# Patient Record
Sex: FEMALE | Race: BLACK OR AFRICAN AMERICAN | NOT HISPANIC OR LATINO | Employment: FULL TIME | URBAN - METROPOLITAN AREA
[De-identification: names, ages, dates, MRNs, and addresses within clinical notes are randomized per-mention and may not be internally consistent; named-entity substitution may affect disease eponyms.]

---

## 2017-09-19 ENCOUNTER — TRANSCRIBE ORDERS (OUTPATIENT)
Dept: LAB | Facility: HOSPITAL | Age: 27
End: 2017-09-19

## 2017-09-19 ENCOUNTER — LAB (OUTPATIENT)
Dept: LAB | Facility: HOSPITAL | Age: 27
End: 2017-09-19

## 2017-09-19 DIAGNOSIS — N93.9 ABNORMAL UTERINE BLEEDING (AUB): Primary | ICD-10-CM

## 2017-09-19 DIAGNOSIS — N93.9 ABNORMAL UTERINE BLEEDING (AUB): ICD-10-CM

## 2017-09-19 LAB
DEPRECATED RDW RBC AUTO: 43.9 FL (ref 37–54)
ERYTHROCYTE [DISTWIDTH] IN BLOOD BY AUTOMATED COUNT: 15.8 % (ref 11.3–14.5)
ESTRADIOL SERPL HS-MCNC: 89 PG/ML
FSH SERPL-ACNC: 5.7 MIU/ML
GLUCOSE BLD-MCNC: 79 MG/DL (ref 70–100)
HCG INTACT+B SERPL-ACNC: <5 MIU/ML
HCT VFR BLD AUTO: 33.7 % (ref 34.5–44)
HGB BLD-MCNC: 10.4 G/DL (ref 11.5–15.5)
LH SERPL-ACNC: 12.9 MIU/ML
MCH RBC QN AUTO: 23.4 PG (ref 27–31)
MCHC RBC AUTO-ENTMCNC: 30.9 G/DL (ref 32–36)
MCV RBC AUTO: 75.9 FL (ref 80–99)
PLATELET # BLD AUTO: 307 10*3/MM3 (ref 150–450)
PMV BLD AUTO: 11.1 FL (ref 6–12)
PROGEST SERPL-MCNC: 0.18 NG/ML
PROLACTIN SERPL-MCNC: 8.8 NG/ML
RBC # BLD AUTO: 4.44 10*6/MM3 (ref 3.89–5.14)
TESTOST SERPL-MCNC: 31.19 NG/DL
TSH SERPL DL<=0.05 MIU/L-ACNC: 0.64 MIU/ML (ref 0.35–5.35)
WBC NRBC COR # BLD: 6.44 10*3/MM3 (ref 3.5–10.8)

## 2017-09-19 PROCEDURE — 84144 ASSAY OF PROGESTERONE: CPT | Performed by: PHYSICIAN ASSISTANT

## 2017-09-19 PROCEDURE — 84702 CHORIONIC GONADOTROPIN TEST: CPT | Performed by: PHYSICIAN ASSISTANT

## 2017-09-19 PROCEDURE — 84443 ASSAY THYROID STIM HORMONE: CPT | Performed by: PHYSICIAN ASSISTANT

## 2017-09-19 PROCEDURE — 83001 ASSAY OF GONADOTROPIN (FSH): CPT | Performed by: PHYSICIAN ASSISTANT

## 2017-09-19 PROCEDURE — 82627 DEHYDROEPIANDROSTERONE: CPT | Performed by: PHYSICIAN ASSISTANT

## 2017-09-19 PROCEDURE — 84146 ASSAY OF PROLACTIN: CPT | Performed by: PHYSICIAN ASSISTANT

## 2017-09-19 PROCEDURE — 82947 ASSAY GLUCOSE BLOOD QUANT: CPT | Performed by: PHYSICIAN ASSISTANT

## 2017-09-19 PROCEDURE — 84403 ASSAY OF TOTAL TESTOSTERONE: CPT | Performed by: PHYSICIAN ASSISTANT

## 2017-09-19 PROCEDURE — 82670 ASSAY OF TOTAL ESTRADIOL: CPT | Performed by: PHYSICIAN ASSISTANT

## 2017-09-19 PROCEDURE — 83525 ASSAY OF INSULIN: CPT | Performed by: PHYSICIAN ASSISTANT

## 2017-09-19 PROCEDURE — 83002 ASSAY OF GONADOTROPIN (LH): CPT | Performed by: PHYSICIAN ASSISTANT

## 2017-09-19 PROCEDURE — 36415 COLL VENOUS BLD VENIPUNCTURE: CPT | Performed by: PHYSICIAN ASSISTANT

## 2017-09-19 PROCEDURE — 85027 COMPLETE CBC AUTOMATED: CPT | Performed by: PHYSICIAN ASSISTANT

## 2017-09-19 PROCEDURE — 82157 ASSAY OF ANDROSTENEDIONE: CPT | Performed by: PHYSICIAN ASSISTANT

## 2017-09-19 PROCEDURE — 84402 ASSAY OF FREE TESTOSTERONE: CPT | Performed by: PHYSICIAN ASSISTANT

## 2017-09-20 LAB — DHEA-S SERPL-MCNC: 95.9 UG/DL (ref 84.8–378)

## 2017-09-21 LAB
INSULIN SERPL-ACNC: 16 UIU/ML
TESTOST FREE SERPL-MCNC: 0.8 PG/ML (ref 0–4.2)

## 2017-09-24 LAB — ANDROST SERPL-MCNC: 199 NG/DL (ref 41–262)

## 2020-07-01 ENCOUNTER — OFFICE VISIT (OUTPATIENT)
Dept: FAMILY MEDICINE CLINIC | Facility: CLINIC | Age: 30
End: 2020-07-01

## 2020-07-01 ENCOUNTER — LAB (OUTPATIENT)
Dept: LAB | Facility: HOSPITAL | Age: 30
End: 2020-07-01

## 2020-07-01 VITALS
BODY MASS INDEX: 37.48 KG/M2 | DIASTOLIC BLOOD PRESSURE: 78 MMHG | HEART RATE: 97 BPM | SYSTOLIC BLOOD PRESSURE: 118 MMHG | HEIGHT: 66 IN | OXYGEN SATURATION: 98 % | WEIGHT: 233.2 LBS

## 2020-07-01 DIAGNOSIS — E55.9 VITAMIN D DEFICIENCY: ICD-10-CM

## 2020-07-01 DIAGNOSIS — Z11.59 ENCOUNTER FOR HEPATITIS C SCREENING TEST FOR LOW RISK PATIENT: ICD-10-CM

## 2020-07-01 DIAGNOSIS — Z23 NEED FOR TDAP VACCINATION: ICD-10-CM

## 2020-07-01 DIAGNOSIS — J45.20 MILD INTERMITTENT ASTHMA WITHOUT COMPLICATION: ICD-10-CM

## 2020-07-01 DIAGNOSIS — Z76.89 ENCOUNTER TO ESTABLISH CARE: ICD-10-CM

## 2020-07-01 DIAGNOSIS — Z00.00 PHYSICAL EXAM, ANNUAL: Primary | ICD-10-CM

## 2020-07-01 DIAGNOSIS — D68.00 VON WILLEBRAND DISEASE (HCC): ICD-10-CM

## 2020-07-01 LAB
25(OH)D3 SERPL-MCNC: 29.1 NG/ML (ref 30–100)
ALBUMIN SERPL-MCNC: 4 G/DL (ref 3.5–5.2)
ALBUMIN/GLOB SERPL: 1.1 G/DL
ALP SERPL-CCNC: 73 U/L (ref 39–117)
ALT SERPL W P-5'-P-CCNC: 29 U/L (ref 1–33)
ANION GAP SERPL CALCULATED.3IONS-SCNC: 11.2 MMOL/L (ref 5–15)
AST SERPL-CCNC: 28 U/L (ref 1–32)
BASOPHILS # BLD AUTO: 0.02 10*3/MM3 (ref 0–0.2)
BASOPHILS NFR BLD AUTO: 0.3 % (ref 0–1.5)
BILIRUB SERPL-MCNC: 0.3 MG/DL (ref 0.2–1.2)
BUN SERPL-MCNC: 8 MG/DL (ref 6–20)
BUN/CREAT SERPL: 7.3 (ref 7–25)
CALCIUM SPEC-SCNC: 9.4 MG/DL (ref 8.6–10.5)
CHLORIDE SERPL-SCNC: 104 MMOL/L (ref 98–107)
CHOLEST SERPL-MCNC: 148 MG/DL (ref 0–200)
CO2 SERPL-SCNC: 23.8 MMOL/L (ref 22–29)
CREAT SERPL-MCNC: 1.09 MG/DL (ref 0.57–1)
DEPRECATED RDW RBC AUTO: 39.1 FL (ref 37–54)
EOSINOPHIL # BLD AUTO: 0.24 10*3/MM3 (ref 0–0.4)
EOSINOPHIL NFR BLD AUTO: 3.5 % (ref 0.3–6.2)
ERYTHROCYTE [DISTWIDTH] IN BLOOD BY AUTOMATED COUNT: 15 % (ref 12.3–15.4)
GFR SERPL CREATININE-BSD FRML MDRD: 72 ML/MIN/1.73
GLOBULIN UR ELPH-MCNC: 3.6 GM/DL
GLUCOSE SERPL-MCNC: 92 MG/DL (ref 65–99)
HCT VFR BLD AUTO: 39.6 % (ref 34–46.6)
HDLC SERPL-MCNC: 51 MG/DL (ref 40–60)
HGB BLD-MCNC: 12.9 G/DL (ref 12–15.9)
IMM GRANULOCYTES # BLD AUTO: 0.02 10*3/MM3 (ref 0–0.05)
IMM GRANULOCYTES NFR BLD AUTO: 0.3 % (ref 0–0.5)
LDLC SERPL CALC-MCNC: 76 MG/DL (ref 0–100)
LDLC/HDLC SERPL: 1.49 {RATIO}
LYMPHOCYTES # BLD AUTO: 2.52 10*3/MM3 (ref 0.7–3.1)
LYMPHOCYTES NFR BLD AUTO: 37.1 % (ref 19.6–45.3)
MCH RBC QN AUTO: 23.8 PG (ref 26.6–33)
MCHC RBC AUTO-ENTMCNC: 32.6 G/DL (ref 31.5–35.7)
MCV RBC AUTO: 72.9 FL (ref 79–97)
MICROCYTES BLD QL: NORMAL
MONOCYTES # BLD AUTO: 0.54 10*3/MM3 (ref 0.1–0.9)
MONOCYTES NFR BLD AUTO: 8 % (ref 5–12)
NEUTROPHILS NFR BLD AUTO: 3.45 10*3/MM3 (ref 1.7–7)
NEUTROPHILS NFR BLD AUTO: 50.8 % (ref 42.7–76)
NRBC BLD AUTO-RTO: 0 /100 WBC (ref 0–0.2)
PLAT MORPH BLD: NORMAL
PLATELET # BLD AUTO: 293 10*3/MM3 (ref 140–450)
PMV BLD AUTO: 11.6 FL (ref 6–12)
POTASSIUM SERPL-SCNC: 4.6 MMOL/L (ref 3.5–5.2)
PROT SERPL-MCNC: 7.6 G/DL (ref 6–8.5)
RBC # BLD AUTO: 5.43 10*6/MM3 (ref 3.77–5.28)
SODIUM SERPL-SCNC: 139 MMOL/L (ref 136–145)
TRIGL SERPL-MCNC: 105 MG/DL (ref 0–150)
TSH SERPL DL<=0.05 MIU/L-ACNC: 1.36 UIU/ML (ref 0.27–4.2)
VLDLC SERPL-MCNC: 21 MG/DL (ref 5–40)
WBC # BLD AUTO: 6.79 10*3/MM3 (ref 3.4–10.8)
WBC MORPH BLD: NORMAL

## 2020-07-01 PROCEDURE — 36415 COLL VENOUS BLD VENIPUNCTURE: CPT | Performed by: PHYSICIAN ASSISTANT

## 2020-07-01 PROCEDURE — 82306 VITAMIN D 25 HYDROXY: CPT | Performed by: PHYSICIAN ASSISTANT

## 2020-07-01 PROCEDURE — 80053 COMPREHEN METABOLIC PANEL: CPT | Performed by: PHYSICIAN ASSISTANT

## 2020-07-01 PROCEDURE — 86803 HEPATITIS C AB TEST: CPT | Performed by: PHYSICIAN ASSISTANT

## 2020-07-01 PROCEDURE — 85025 COMPLETE CBC W/AUTO DIFF WBC: CPT | Performed by: PHYSICIAN ASSISTANT

## 2020-07-01 PROCEDURE — 80061 LIPID PANEL: CPT | Performed by: PHYSICIAN ASSISTANT

## 2020-07-01 PROCEDURE — 99385 PREV VISIT NEW AGE 18-39: CPT | Performed by: PHYSICIAN ASSISTANT

## 2020-07-01 PROCEDURE — 84443 ASSAY THYROID STIM HORMONE: CPT | Performed by: PHYSICIAN ASSISTANT

## 2020-07-01 PROCEDURE — 90471 IMMUNIZATION ADMIN: CPT | Performed by: PHYSICIAN ASSISTANT

## 2020-07-01 PROCEDURE — 85007 BL SMEAR W/DIFF WBC COUNT: CPT | Performed by: PHYSICIAN ASSISTANT

## 2020-07-01 PROCEDURE — 90715 TDAP VACCINE 7 YRS/> IM: CPT | Performed by: PHYSICIAN ASSISTANT

## 2020-07-01 RX ORDER — ALBUTEROL SULFATE 90 UG/1
2 AEROSOL, METERED RESPIRATORY (INHALATION) EVERY 4 HOURS PRN
Qty: 1 INHALER | Refills: 5 | OUTPATIENT
Start: 2020-07-01 | End: 2021-06-23

## 2020-07-01 NOTE — PATIENT INSTRUCTIONS
"General Health Maintenance:  -Yearly dermatology exam  -Yearly eye exam if you are diabetic, otherwise every 2 years for patients without diabetes  -Dental exams/cleanings at least twice a year  -Staying current on your required colonoscopy, starts at age 50 unless there are other indications prior  -Regular pap smears (at least every 1-3 years until age 65)  -Yearly pelvic and breast exams  -Yearly mammograms starting at age 40    Vaccines:  -Talk to pharmacist about shingrix shot  -Obtain flu shot when available  -Health department is recommending Hepatitis A vaccine    The largest impact you can have on your own health is getting the proper nutrition, exercise and maintaining an overall healthy body weight.  Proper nutrition involves eating lots of vegetables, fruit, minimal lean meat and avoiding processed foods and limiting refined sugars, carbohydrates and starches (\"the white stuff\").  Drinking at least 8 cups of water daily.  Walking at least 30 minutes a day and if possible, increasing this to a more cardiovascular aerobic exercise.  Maintaining a healthy body weight.      If you smoke or use tobacco products, quitting is extremely important and I am happy to discuss options with you regarding how to do this and what's available to assist you.    If you consume alcohol, limit your alcohol intake to 2 drinks a day for men and 1 drink a day for woman.    It is also important to make sure to keep regular appointments and have all general health maintenance items completed in a timely manner.      Thank you for entrusting me with your care.  It is a pleasure and honor to participate in your health.    "

## 2020-07-01 NOTE — PROGRESS NOTES
Chief Complaint   Patient presents with   • Establish Care     pt reports that she has not been to the doctor in years       HPI     Ingrid Boykin is a 30 y.o. female who presents to establish care.  Patient has not been to a PCP in years.  Has no concerns or complaints today.  Has known Von Willebrand's disease and asthma.  Patient is not currently followed by hematology.  Was initially seen at  for work-up.  Has not had any issues with bleeding.  She has history of asthma that has improved as she has gotten older.  Occasionally feels short of breath when walking.  Does not have inhaler currently.  She is followed by gynecology and is currently on Sprintec.  No concern for STIs.  Denies any skin lesions/mole concerns.  Goes to dentist regularly.  Denies any vision issues.    Chief Complaint   Patient presents with   • Establish Care     pt reports that she has not been to the doctor in years       Past Medical History:   Diagnosis Date   • Anemia    • Asthma    • GERD (gastroesophageal reflux disease)        Past Surgical History:   Procedure Laterality Date   • WISDOM TOOTH EXTRACTION  2012       Family History   Problem Relation Age of Onset   • Diabetes Mother    • Hypertension Mother    • Hyperlipidemia Mother    • Hypertension Father    • Diabetes Maternal Grandmother    • Heart attack Maternal Grandfather    • Ovarian cancer Neg Hx    • Breast cancer Neg Hx    • Colon cancer Neg Hx        Social History     Socioeconomic History   • Marital status: Single     Spouse name: Not on file   • Number of children: Not on file   • Years of education: Not on file   • Highest education level: Not on file   Tobacco Use   • Smoking status: Never Smoker   • Smokeless tobacco: Never Used   Substance and Sexual Activity   • Alcohol use: Yes     Alcohol/week: 3.0 standard drinks     Types: 3 Glasses of wine per week   • Drug use: Never   • Sexual activity: Yes     Partners: Male     Birth control/protection: Pill       No  "Known Allergies    ROS    Review of Systems   Constitutional: Positive for fatigue. Negative for chills, diaphoresis and fever.   HENT: Negative for congestion, ear pain, hearing loss, postnasal drip, rhinorrhea and sore throat.    Eyes: Negative for blurred vision and pain.   Respiratory: Positive for shortness of breath and wheezing. Negative for cough.    Cardiovascular: Positive for leg swelling. Negative for chest pain.   Gastrointestinal: Negative for abdominal pain, blood in stool, constipation, diarrhea, nausea, vomiting and indigestion.   Endocrine: Negative for polyuria.   Genitourinary: Negative for dysuria, flank pain and hematuria.   Musculoskeletal: Negative for arthralgias, gait problem and myalgias.   Skin: Negative for rash and skin lesions.   Neurological: Negative for dizziness and headache.   Psychiatric/Behavioral: Negative for self-injury, sleep disturbance, suicidal ideas, depressed mood and stress. The patient is not nervous/anxious.        Vitals:    07/01/20 0948   BP: 118/78   BP Location: Left arm   Patient Position: Sitting   Cuff Size: Adult   Pulse: 97   SpO2: 98%   Weight: 106 kg (233 lb 3.2 oz)   Height: 167.6 cm (66\")     Body mass index is 37.64 kg/m².    Current Outpatient Medications on File Prior to Visit   Medication Sig Dispense Refill   • SPRINTEC 28 0.25-35 MG-MCG per tablet Take 1 tablet by mouth Daily.       No current facility-administered medications on file prior to visit.        Results for orders placed or performed in visit on 09/19/17   Insulin, Random   Result Value Ref Range    Insulin 16 uIU/mL   TSH   Result Value Ref Range    TSH 0.642 0.350 - 5.350 mIU/mL   Androstenedione   Result Value Ref Range    Androstenedione 199 41 - 262 ng/dL   DHEA-Sulfate   Result Value Ref Range    DHEA-Sulfate 95.9 84.8 - 378.0 ug/dL   Luteinizing Hormone   Result Value Ref Range    LH 12.90 mIU/mL   Prolactin   Result Value Ref Range    Prolactin 8.80 ng/mL   Testosterone Free " Direct   Result Value Ref Range    Testosterone, Free 0.8 0.0 - 4.2 pg/mL       PE  Physical Exam   Constitutional: She is oriented to person, place, and time. Vital signs are normal. She appears well-developed and well-nourished. She is active and cooperative. She does not appear ill. No distress. She is obese.  HENT:   Head: Normocephalic and atraumatic.   Right Ear: Hearing, tympanic membrane, external ear and ear canal normal.   Left Ear: Hearing, tympanic membrane, external ear and ear canal normal.   Nose: Nose normal. Right sinus exhibits no maxillary sinus tenderness and no frontal sinus tenderness. Left sinus exhibits no maxillary sinus tenderness and no frontal sinus tenderness.   Mouth/Throat: Mucous membranes are normal.   mallampati IV.  Unable to visualize uvula.   Eyes: Conjunctivae, EOM and lids are normal.   Neck: Trachea normal, normal range of motion and phonation normal. No thyroid mass and no thyromegaly present.   Cardiovascular: Normal rate, regular rhythm and normal heart sounds.   Pulmonary/Chest: Effort normal. She has decreased breath sounds. She has no wheezes. She has no rhonchi. She has no rales.   Abdominal: Soft. Normal appearance and bowel sounds are normal. She exhibits no distension. There is no tenderness. There is no rigidity, no guarding and no CVA tenderness.   Musculoskeletal: Normal range of motion. She exhibits no edema.   Lymphadenopathy:     She has no cervical adenopathy.        Right cervical: No superficial cervical adenopathy present.       Left cervical: No superficial cervical adenopathy present.   Neurological: She is alert and oriented to person, place, and time. She has normal strength and normal reflexes. Coordination and gait normal.   CN grossly intact   Skin: Skin is warm and intact. No rash noted. She is not diaphoretic. No cyanosis or erythema. Nails show no clubbing.   Psychiatric: She has a normal mood and affect. Her speech is normal and behavior is  normal. Judgment and thought content normal. She is not actively hallucinating. Cognition and memory are normal. She is attentive.   Vitals reviewed.      A/P    Ingrid was seen today for establish care.    Diagnoses and all orders for this visit:    Physical exam, annual  Encounter to establish care  -     CBC Auto Differential; Future  -     Comprehensive Metabolic Panel; Future  -     TSH Rfx On Abnormal To Free T4; Future  -     Lipid Panel; Future  PE is unremarkable  Preventative labs ordered  Established with gynecology  Dentist - goes regularly  Ophthalmologist - denies vision issues  Dermatologist - denies skin lesions or moles  Flu vaccine encouraged in Fall, Tdap today  Discussed HPV vaccine, she will address with gynecologist    Von Willebrand disease (CMS/HCC)  Diagnosed as a child at [facility].  Not currently followed by hematology.  Has not had any issues with this.    Mild intermittent asthma without complication  -     albuterol sulfate  (90 Base) MCG/ACT inhaler; Inhale 2 puffs Every 4 (Four) Hours As Needed for Wheezing or Shortness of Air.  Occasional shortness of breath with walking.  Doesn't have albuterol inhaler, will prescribe.    Encounter for hepatitis C screening test for low risk patient  -     Hepatitis C Antibody; Future    Vitamin D deficiency  -     Vitamin D 25 Hydroxy; Future    Need for Tdap vaccination  -     Tdap Vaccine Greater Than or Equal To 6yo IM         Plan of care reviewed with patient at the conclusion of today's visit. Education was provided regarding diagnosis, management and any prescribed or recommended OTC medications.  Patient verbalizes understanding of and agreement with management plan.    Return in about 1 year (around 7/2/2021) for Annual physical.     Marjan Sánchez PA-C

## 2020-07-02 LAB — HCV AB SER DONR QL: NORMAL

## 2020-07-06 DIAGNOSIS — D68.00 VON WILLEBRAND DISEASE (HCC): ICD-10-CM

## 2020-07-06 DIAGNOSIS — D50.9 MICROCYTIC ANEMIA: Primary | ICD-10-CM

## 2020-07-10 ENCOUNTER — LAB (OUTPATIENT)
Dept: LAB | Facility: HOSPITAL | Age: 30
End: 2020-07-10

## 2020-07-10 DIAGNOSIS — D68.00 VON WILLEBRAND DISEASE (HCC): ICD-10-CM

## 2020-07-10 DIAGNOSIS — D50.9 MICROCYTIC ANEMIA: ICD-10-CM

## 2020-07-10 LAB
APTT PPP: 32.1 SECONDS (ref 24–37)
FERRITIN SERPL-MCNC: 31.3 NG/ML (ref 13–150)
IRON 24H UR-MRATE: 71 MCG/DL (ref 37–145)
RETICS # AUTO: 0.08 10*6/MM3 (ref 0.02–0.13)
RETICS/RBC NFR AUTO: 1.48 % (ref 0.7–1.9)

## 2020-07-10 PROCEDURE — 85045 AUTOMATED RETICULOCYTE COUNT: CPT

## 2020-07-10 PROCEDURE — 85246 CLOT FACTOR VIII VW ANTIGEN: CPT

## 2020-07-10 PROCEDURE — 82728 ASSAY OF FERRITIN: CPT

## 2020-07-10 PROCEDURE — 85240 CLOT FACTOR VIII AHG 1 STAGE: CPT

## 2020-07-10 PROCEDURE — 83540 ASSAY OF IRON: CPT

## 2020-07-10 PROCEDURE — 85245 CLOT FACTOR VIII VW RISTOCTN: CPT

## 2020-07-10 PROCEDURE — 85730 THROMBOPLASTIN TIME PARTIAL: CPT

## 2020-07-10 PROCEDURE — 36415 COLL VENOUS BLD VENIPUNCTURE: CPT

## 2020-07-14 LAB
FACT VIII ACT/NOR PPP: 55 % (ref 56–140)
VWF AG ACT/NOR PPP IA: 87 % (ref 50–200)
VWF CP PPP QL: 51 % (ref 50–200)

## 2021-05-16 ENCOUNTER — APPOINTMENT (OUTPATIENT)
Dept: GENERAL RADIOLOGY | Facility: HOSPITAL | Age: 31
End: 2021-05-16

## 2021-05-16 ENCOUNTER — HOSPITAL ENCOUNTER (EMERGENCY)
Facility: HOSPITAL | Age: 31
Discharge: HOME | End: 2021-05-16
Attending: EMERGENCY MEDICINE | Admitting: EMERGENCY MEDICINE

## 2021-05-16 VITALS
DIASTOLIC BLOOD PRESSURE: 89 MMHG | TEMPERATURE: 97.6 F | HEART RATE: 78 BPM | HEIGHT: 66 IN | OXYGEN SATURATION: 98 % | SYSTOLIC BLOOD PRESSURE: 140 MMHG | RESPIRATION RATE: 16 BRPM | WEIGHT: 215 LBS | BODY MASS INDEX: 34.55 KG/M2

## 2021-05-16 DIAGNOSIS — M25.572 ACUTE LEFT ANKLE PAIN: Primary | ICD-10-CM

## 2021-05-16 DIAGNOSIS — Z87.09 HISTORY OF ASTHMA: ICD-10-CM

## 2021-05-16 DIAGNOSIS — M21.42 PES PLANUS OF BOTH FEET: ICD-10-CM

## 2021-05-16 DIAGNOSIS — M21.41 PES PLANUS OF BOTH FEET: ICD-10-CM

## 2021-05-16 DIAGNOSIS — M25.472 LEFT ANKLE SWELLING: ICD-10-CM

## 2021-05-16 PROCEDURE — 99282 EMERGENCY DEPT VISIT SF MDM: CPT

## 2021-05-16 PROCEDURE — 73610 X-RAY EXAM OF ANKLE: CPT

## 2021-05-17 NOTE — ED PROVIDER NOTES
Subjective   This is a 31-year-old female that presents to the ER with left ankle pain and mild soft tissue swelling for the last 3 days.  Patient works as an  at [employer].  She works approximately 10 hours/day, 5 days/week and stands throughout the day.  Patient is flat-footed.  She denies any recent known injury, trauma, fall, or twisting.  She wears [employer] tennis shoes.  Patient says that she follows with an ankle/foot doctor due to being flat-footed.  She is in the process of having some special inserts made.  Pain is throbbing in nature.  She says pain is more to the lateral aspect.  There is no redness or warmth.  She denies any numbness or tingling to the toes.  She denies any chest pain or shortness of breath.  She denies fever or chills.  Pain is worsened with ambulation and movement.  Patient has not tried anything OTC for the above symptoms of pain.  Past medical history is significant for GERD and asthma.  Last menstrual period was 3 weeks ago.      History provided by:  Patient  Ankle Pain  Location:  Ankle  Time since incident:  3 days  Injury: no    Ankle location:  L ankle  Pain details:     Quality:  Throbbing    Onset quality:  Gradual    Duration:  3 days    Timing:  Constant  Chronicity:  New  Dislocation: no    Prior injury to area:  Yes  Relieved by:  Nothing  Worsened by:  Bearing weight, flexion and activity  Ineffective treatments:  None tried  Associated symptoms: swelling    Associated symptoms: no back pain, no decreased ROM, no fatigue, no fever, no numbness and no tingling        Review of Systems   Constitutional: Negative.  Negative for appetite change, chills, diaphoresis, fatigue and fever.   Respiratory: Negative.  Negative for shortness of breath.    Cardiovascular: Negative.  Negative for chest pain and leg swelling.   Gastrointestinal: Negative.  Negative for abdominal pain, nausea and vomiting.   Genitourinary: Negative.         LMP: 3 weeks    Musculoskeletal: Positive for arthralgias (left ankle pain, mainly lateral aspect.) and joint swelling. Negative for back pain and gait problem.   Skin: Negative for color change.        Mild localized soft tissue swelling to left ankle.   Neurological: Negative.  Negative for numbness.   All other systems reviewed and are negative.      Past Medical History:   Diagnosis Date   • Anemia    • Asthma    • GERD (gastroesophageal reflux disease)        No Known Allergies    Past Surgical History:   Procedure Laterality Date   • WISDOM TOOTH EXTRACTION  2012       Family History   Problem Relation Age of Onset   • Diabetes Mother    • Hypertension Mother    • Hyperlipidemia Mother    • Hypertension Father    • Diabetes Maternal Grandmother    • Heart attack Maternal Grandfather    • Ovarian cancer Neg Hx    • Breast cancer Neg Hx    • Colon cancer Neg Hx        Social History     Socioeconomic History   • Marital status: Single     Spouse name: Not on file   • Number of children: Not on file   • Years of education: Not on file   • Highest education level: Not on file   Tobacco Use   • Smoking status: Never Smoker   • Smokeless tobacco: Never Used   Substance and Sexual Activity   • Alcohol use: Yes     Alcohol/week: 3.0 standard drinks     Types: 3 Glasses of wine per week   • Drug use: Never   • Sexual activity: Yes     Partners: Male     Birth control/protection: Pill           Objective   Physical Exam  Vitals and nursing note reviewed.   Constitutional:       Appearance: Normal appearance.   HENT:      Head: Normocephalic and atraumatic.      Right Ear: Tympanic membrane normal.      Left Ear: Tympanic membrane normal.      Nose: Nose normal.      Mouth/Throat:      Mouth: Mucous membranes are moist.      Pharynx: Oropharynx is clear.   Eyes:      Extraocular Movements: Extraocular movements intact.      Conjunctiva/sclera: Conjunctivae normal.      Pupils: Pupils are equal, round, and reactive to light.    Cardiovascular:      Rate and Rhythm: Normal rate and regular rhythm.      Pulses: Normal pulses.           Dorsalis pedis pulses are 2+ on the right side and 2+ on the left side.        Posterior tibial pulses are 2+ on the right side and 2+ on the left side.      Heart sounds: Normal heart sounds.   Pulmonary:      Effort: Pulmonary effort is normal.      Breath sounds: Normal breath sounds.   Abdominal:      General: Bowel sounds are normal.      Palpations: Abdomen is soft.   Musculoskeletal:         General: Normal range of motion.      Cervical back: Normal range of motion and neck supple.      Left ankle: Swelling present. No deformity. Tenderness present over the lateral malleolus. Normal range of motion. Normal pulse.      Left Achilles Tendon: Normal.        Legs:       Comments: Localized soft tissue swelling to the left ankle, both medial and lateral aspects.  Tenderness noted to the left lateral malleolus.  No erythema, warmth.  Full range of motion.  Strong left DP and PT pulses.  No tenderness to the left foot.  Painful weightbearing.   Skin:     General: Skin is warm and dry.      Findings: No bruising, ecchymosis or erythema.      Comments: No bruising, erythema, or warmth to the left ankle.  Mild localized soft tissue swelling.   Neurological:      General: No focal deficit present.      Mental Status: She is alert.         Procedures           ED Course  ED Course as of May 16 2241   Sun May 16, 2021   2237 X-rays of the left ankle reveal no acute bony abnormality.  These were read by the radiologist.  Patient has mild localized soft tissue swelling with increased pain with weightbearing.  Question whether she sprained the left ankle without realizing it.  Recommended compression sock, which patient has at home.  I offered Ace wrap, but patient deferred on this.  Rx for diclofenac 50 mg by mouth 3 times daily with meals x7 days as needed for pain/inflammation.  Recommend follow-up closely with  "her routine ankle/foot doctor and recommend inserts and tennis shoes of specialists recommendation.  Recommend limited weightbearing for the next couple of days to allow the left ankle and foot to rest.  Elevate as much as possible.  Return to the ER if worsening symptoms.    [FC]      ED Course User Index  [FC] Teresita Castellon PA-C      No results found for this or any previous visit (from the past 24 hour(s)).  Note: In addition to lab results from this visit, the labs listed above may include labs taken at another facility or during a different encounter within the last 24 hours. Please correlate lab times with ED admission and discharge times for further clarification of the services performed during this visit.    XR Ankle 3+ View Left   Final Result   Negative left ankle.      Signer Name: Edwin Bullard MD    Signed: 5/16/2021 9:35 PM    Workstation Name: [email or web address]     [facility]        Vitals:    05/16/21 2105   BP: 140/89   BP Location: Right arm   Patient Position: Sitting   Pulse: 78   Resp: 16   Temp: 97.6 °F (36.4 °C)   TempSrc: Oral   SpO2: 98%   Weight: 97.5 kg (215 lb)   Height: 167.6 cm (66\")     Medications - No data to display  ECG/EMG Results (last 24 hours)     ** No results found for the last 24 hours. **        No orders to display                                            MDM    Final diagnoses:   Acute left ankle pain   Left ankle swelling   Pes planus of both feet   History of asthma       ED Disposition  ED Disposition     ED Disposition Condition Comment    Discharge Stable           Routine ankle/foot specialist    Schedule an appointment as soon as possible for a visit in 2 days  Call in the morning for first available follow-up    [facility] Emergency Department  [address] [place] [id number] [phone]    If symptoms worsen         Medication List      New Prescriptions    diclofenac 50 MG EC tablet  Commonly known as: " VOLTAREN  Take 1 tablet by mouth 3 (Three) Times a Day.        Stop    Sprintec 28 0.25-35 MG-MCG per tablet  Generic drug: norgestimate-ethinyl estradiol           Where to Get Your Medications      These medications were sent to [facility]/pharmacy #[id number] - [place], [place] - [address] - [phone]  - [phone] FX  [address], [facility] [address]    Hours: 24-hours Phone: [phone]   · diclofenac 50 MG EC tablet          Teresita Castellon, PAElianeC  05/16/21 6361

## 2021-05-17 NOTE — DISCHARGE INSTRUCTIONS
X-rays of the left ankle reveal no acute bony abnormality.  These were read by the radiologist.  Recommend compression sock to help with swelling and help with stability.  Recommend elevation and limited weightbearing is much as possible.  Recommend first available follow-up with patient's foot and ankle specialist.  Patient really needs to have special inserts and have the specialist recommend particular type of tennis shoes.  Rx for diclofenac 50 mg by mouth 3 times daily with meals as needed for pain/inflammation.  Return to the ER sooner if any worsening symptoms.

## 2021-06-23 PROCEDURE — 87186 SC STD MICRODIL/AGAR DIL: CPT | Performed by: FAMILY MEDICINE

## 2021-06-23 PROCEDURE — 87205 SMEAR GRAM STAIN: CPT | Performed by: FAMILY MEDICINE

## 2021-06-23 PROCEDURE — 87070 CULTURE OTHR SPECIMN AEROBIC: CPT | Performed by: FAMILY MEDICINE

## 2021-06-23 PROCEDURE — 87147 CULTURE TYPE IMMUNOLOGIC: CPT | Performed by: FAMILY MEDICINE

## 2021-07-29 ENCOUNTER — TELEMEDICINE (OUTPATIENT)
Dept: FAMILY MEDICINE CLINIC | Facility: TELEHEALTH | Age: 31
End: 2021-07-29

## 2021-07-29 DIAGNOSIS — Z20.822 ENCOUNTER FOR LABORATORY TESTING FOR COVID-19 VIRUS: Primary | ICD-10-CM

## 2021-07-29 PROCEDURE — U0004 COV-19 TEST NON-CDC HGH THRU: HCPCS | Performed by: NURSE PRACTITIONER

## 2021-07-29 PROCEDURE — 99212 OFFICE O/P EST SF 10 MIN: CPT | Performed by: NURSE PRACTITIONER

## 2021-07-29 NOTE — PATIENT INSTRUCTIONS
I have included a COVID-19 test. Go to your nearest [facility] for testing. Tell them you have already been seen virtually and only need testing   We will notify you of your COVID-19 results by phone. You will receive a call from an unknown or blocked number. You can also get your results on your [facility]keem.

## 2021-07-29 NOTE — PROGRESS NOTES
CHIEF COMPLAINT  No chief complaint on file.        HPI  Ingrid Boykin is a 31 y.o. female  presents with need for COVID-19 testing before going on a trip for her honeymoon. She has no symptoms and has spoken with the airport personal. She reports she does not need a test leaving the country, but must have one when she returns. She wants to be safe and make sure she has not contracted the virus before leaving.   She is in the care with her fiance    Review of Systems   Constitutional: Negative for chills, diaphoresis, fatigue and fever.   HENT: Negative for congestion, rhinorrhea, sneezing and sore throat.         Reports no loss of taste or smell.    Respiratory: Negative for cough, shortness of breath and wheezing.    Cardiovascular: Negative for chest pain.   Gastrointestinal: Negative for diarrhea, nausea and vomiting.   Musculoskeletal: Negative for arthralgias and myalgias.   Skin: Negative for rash.   Neurological: Negative for headaches.   Hematological: Negative for adenopathy.       Past Medical History:   Diagnosis Date   • Anemia    • Asthma    • GERD (gastroesophageal reflux disease)        Family History   Problem Relation Age of Onset   • Diabetes Mother    • Hypertension Mother    • Hyperlipidemia Mother    • Hypertension Father    • Diabetes Maternal Grandmother    • Heart attack Maternal Grandfather    • Ovarian cancer Neg Hx    • Breast cancer Neg Hx    • Colon cancer Neg Hx        Social History     Socioeconomic History   • Marital status: Single     Spouse name: Not on file   • Number of children: Not on file   • Years of education: Not on file   • Highest education level: Not on file   Tobacco Use   • Smoking status: Never Smoker   • Smokeless tobacco: Never Used   Vaping Use   • Vaping Use: Never used   Substance and Sexual Activity   • Alcohol use: Yes     Alcohol/week: 3.0 standard drinks     Types: 3 Glasses of wine per week   • Drug use: Never   • Sexual activity: Yes     Partners: Male      Birth control/protection: Pill         LMP 07/27/2021 (Approximate)   Breastfeeding No     PHYSICAL EXAM  Physical Exam   Constitutional: She is oriented to person, place, and time. She appears well-developed and well-nourished. She does not have a sickly appearance. She does not appear ill. No distress.   HENT:   Head: Normocephalic and atraumatic.   Eyes: EOM are normal.   Neck: Neck normal appearance.  Pulmonary/Chest: Effort normal.  No respiratory distress.  Neurological: She is alert and oriented to person, place, and time.   Skin: Skin is dry.   Psychiatric: She has a normal mood and affect.         Diagnoses and all orders for this visit:    1. Encounter for laboratory testing for COVID-19 virus (Primary)  -     COVID-19 PCR, [facility], NP SWAB IN [facility]EX[facility] VIRAL TRANSPORT MEDIA/ORAL SWISH 24-30 HR TAT - Swab, Nasopharynx; Future    Pretravel COVID-19 test. Discussed with her best to be done within 72 hours.   COVID-19 immunizations (MODERNA) 3/31/2021 & 4/28/2021      FOLLOW-UP  As discussed during visit with PCP/[facility] if no improvement or Urgent Care/Emergency Department if worsening of symptoms    Patient verbalizes understanding of medication dosage, comfort measures, instructions for treatment and follow-up.    EDWARDO Hernandez  07/29/2021  11:08 EDT    This visit was performed via Telehealth.  This patient has been instructed to follow-up with their primary care provider if their symptoms worsen or the treatment provided does not resolve their illness.

## 2022-03-31 ENCOUNTER — HOSPITAL ENCOUNTER (OUTPATIENT)
Facility: HOSPITAL | Age: 32
Setting detail: OBSERVATION
Discharge: HOME | End: 2022-04-01
Attending: EMERGENCY MEDICINE | Admitting: OBSTETRICS & GYNECOLOGY

## 2022-03-31 ENCOUNTER — APPOINTMENT (OUTPATIENT)
Dept: ULTRASOUND IMAGING | Facility: HOSPITAL | Age: 32
End: 2022-03-31

## 2022-03-31 DIAGNOSIS — N92.0 MENORRHAGIA WITH REGULAR CYCLE: Primary | ICD-10-CM

## 2022-03-31 DIAGNOSIS — D64.9 SYMPTOMATIC ANEMIA: ICD-10-CM

## 2022-03-31 LAB
ABO GROUP BLD: NORMAL
ABO GROUP BLD: NORMAL
ALBUMIN SERPL-MCNC: 4.2 G/DL (ref 3.5–5.2)
ALBUMIN/GLOB SERPL: 1.2 G/DL
ALP SERPL-CCNC: 117 U/L (ref 39–117)
ALT SERPL W P-5'-P-CCNC: 10 U/L (ref 1–33)
ANION GAP SERPL CALCULATED.3IONS-SCNC: 9 MMOL/L (ref 5–15)
AST SERPL-CCNC: 15 U/L (ref 1–32)
B-HCG UR QL: NEGATIVE
BASOPHILS # BLD AUTO: 0.02 10*3/MM3 (ref 0–0.2)
BASOPHILS NFR BLD AUTO: 0.2 % (ref 0–1.5)
BILIRUB SERPL-MCNC: 0.2 MG/DL (ref 0–1.2)
BILIRUB UR QL STRIP: NEGATIVE
BLD GP AB SCN SERPL QL: NEGATIVE
BUN SERPL-MCNC: 9 MG/DL (ref 6–20)
BUN/CREAT SERPL: 7.8 (ref 7–25)
CALCIUM SPEC-SCNC: 9.1 MG/DL (ref 8.6–10.5)
CHLORIDE SERPL-SCNC: 107 MMOL/L (ref 98–107)
CLARITY UR: CLEAR
CO2 SERPL-SCNC: 24 MMOL/L (ref 22–29)
COLOR UR: YELLOW
CREAT SERPL-MCNC: 1.15 MG/DL (ref 0.57–1)
DEPRECATED RDW RBC AUTO: 43.4 FL (ref 37–54)
EGFRCR SERPLBLD CKD-EPI 2021: 65.9 ML/MIN/1.73
ELLIPTOCYTES BLD QL SMEAR: NORMAL
EOSINOPHIL # BLD AUTO: 0.23 10*3/MM3 (ref 0–0.4)
EOSINOPHIL NFR BLD AUTO: 2.6 % (ref 0.3–6.2)
ERYTHROCYTE [DISTWIDTH] IN BLOOD BY AUTOMATED COUNT: 18.6 % (ref 12.3–15.4)
EXPIRATION DATE: NORMAL
GLOBULIN UR ELPH-MCNC: 3.4 GM/DL
GLUCOSE SERPL-MCNC: 89 MG/DL (ref 65–99)
GLUCOSE UR STRIP-MCNC: NEGATIVE MG/DL
HCT VFR BLD AUTO: 20.9 % (ref 34–46.6)
HGB BLD-MCNC: 5.9 G/DL (ref 12–15.9)
HGB UR QL STRIP.AUTO: NEGATIVE
HOLD SPECIMEN: NORMAL
HYPOCHROMIA BLD QL: NORMAL
IMM GRANULOCYTES # BLD AUTO: 0.02 10*3/MM3 (ref 0–0.05)
IMM GRANULOCYTES NFR BLD AUTO: 0.2 % (ref 0–0.5)
INTERNAL NEGATIVE CONTROL: NEGATIVE
INTERNAL POSITIVE CONTROL: POSITIVE
KETONES UR QL STRIP: ABNORMAL
LEUKOCYTE ESTERASE UR QL STRIP.AUTO: NEGATIVE
LYMPHOCYTES # BLD AUTO: 3.76 10*3/MM3 (ref 0.7–3.1)
LYMPHOCYTES NFR BLD AUTO: 43.1 % (ref 19.6–45.3)
Lab: NORMAL
MCH RBC QN AUTO: 18.8 PG (ref 26.6–33)
MCHC RBC AUTO-ENTMCNC: 28.2 G/DL (ref 31.5–35.7)
MCV RBC AUTO: 66.6 FL (ref 79–97)
MICROCYTES BLD QL: NORMAL
MONOCYTES # BLD AUTO: 0.59 10*3/MM3 (ref 0.1–0.9)
MONOCYTES NFR BLD AUTO: 6.8 % (ref 5–12)
NEUTROPHILS NFR BLD AUTO: 4.11 10*3/MM3 (ref 1.7–7)
NEUTROPHILS NFR BLD AUTO: 47.1 % (ref 42.7–76)
NITRITE UR QL STRIP: NEGATIVE
NRBC BLD AUTO-RTO: 0.3 /100 WBC (ref 0–0.2)
PH UR STRIP.AUTO: <=5 [PH] (ref 5–8)
PLAT MORPH BLD: NORMAL
PLATELET # BLD AUTO: 328 10*3/MM3 (ref 140–450)
PMV BLD AUTO: 11.1 FL (ref 6–12)
POTASSIUM SERPL-SCNC: 3.7 MMOL/L (ref 3.5–5.2)
PROT SERPL-MCNC: 7.6 G/DL (ref 6–8.5)
PROT UR QL STRIP: NEGATIVE
RBC # BLD AUTO: 3.14 10*6/MM3 (ref 3.77–5.28)
RH BLD: POSITIVE
RH BLD: POSITIVE
SODIUM SERPL-SCNC: 140 MMOL/L (ref 136–145)
SP GR UR STRIP: 1.03 (ref 1–1.03)
T&S EXPIRATION DATE: NORMAL
UROBILINOGEN UR QL STRIP: ABNORMAL
WBC MORPH BLD: NORMAL
WBC NRBC COR # BLD: 8.73 10*3/MM3 (ref 3.4–10.8)
WHOLE BLOOD HOLD SPECIMEN: NORMAL
WHOLE BLOOD HOLD SPECIMEN: NORMAL

## 2022-03-31 PROCEDURE — 99284 EMERGENCY DEPT VISIT MOD MDM: CPT

## 2022-03-31 PROCEDURE — G0378 HOSPITAL OBSERVATION PER HR: HCPCS

## 2022-03-31 PROCEDURE — 80053 COMPREHEN METABOLIC PANEL: CPT | Performed by: EMERGENCY MEDICINE

## 2022-03-31 PROCEDURE — 76830 TRANSVAGINAL US NON-OB: CPT

## 2022-03-31 PROCEDURE — 81025 URINE PREGNANCY TEST: CPT | Performed by: EMERGENCY MEDICINE

## 2022-03-31 PROCEDURE — 86900 BLOOD TYPING SEROLOGIC ABO: CPT

## 2022-03-31 PROCEDURE — 86923 COMPATIBILITY TEST ELECTRIC: CPT

## 2022-03-31 PROCEDURE — 86901 BLOOD TYPING SEROLOGIC RH(D): CPT | Performed by: EMERGENCY MEDICINE

## 2022-03-31 PROCEDURE — 85007 BL SMEAR W/DIFF WBC COUNT: CPT | Performed by: EMERGENCY MEDICINE

## 2022-03-31 PROCEDURE — 86900 BLOOD TYPING SEROLOGIC ABO: CPT | Performed by: EMERGENCY MEDICINE

## 2022-03-31 PROCEDURE — 86901 BLOOD TYPING SEROLOGIC RH(D): CPT

## 2022-03-31 PROCEDURE — 36430 TRANSFUSION BLD/BLD COMPNT: CPT

## 2022-03-31 PROCEDURE — 86850 RBC ANTIBODY SCREEN: CPT | Performed by: EMERGENCY MEDICINE

## 2022-03-31 PROCEDURE — 63710000001 DIPHENHYDRAMINE PER 50 MG: Performed by: OBSTETRICS & GYNECOLOGY

## 2022-03-31 PROCEDURE — 85025 COMPLETE CBC W/AUTO DIFF WBC: CPT | Performed by: EMERGENCY MEDICINE

## 2022-03-31 PROCEDURE — P9016 RBC LEUKOCYTES REDUCED: HCPCS

## 2022-03-31 PROCEDURE — 93005 ELECTROCARDIOGRAM TRACING: CPT | Performed by: EMERGENCY MEDICINE

## 2022-03-31 PROCEDURE — 81003 URINALYSIS AUTO W/O SCOPE: CPT | Performed by: EMERGENCY MEDICINE

## 2022-03-31 RX ORDER — SODIUM CHLORIDE 0.9 % (FLUSH) 0.9 %
10 SYRINGE (ML) INJECTION AS NEEDED
Status: DISCONTINUED | OUTPATIENT
Start: 2022-03-31 | End: 2022-04-01 | Stop reason: HOSPADM

## 2022-03-31 RX ORDER — SODIUM CHLORIDE 0.9 % (FLUSH) 0.9 %
10 SYRINGE (ML) INJECTION EVERY 12 HOURS SCHEDULED
Status: DISCONTINUED | OUTPATIENT
Start: 2022-03-31 | End: 2022-04-01 | Stop reason: HOSPADM

## 2022-03-31 RX ORDER — MEDROXYPROGESTERONE ACETATE 10 MG/1
20 TABLET ORAL 3 TIMES DAILY
Status: DISCONTINUED | OUTPATIENT
Start: 2022-03-31 | End: 2022-04-01 | Stop reason: HOSPADM

## 2022-03-31 RX ORDER — ACETAMINOPHEN 650 MG/1
650 SUPPOSITORY RECTAL ONCE
Status: COMPLETED | OUTPATIENT
Start: 2022-03-31 | End: 2022-03-31

## 2022-03-31 RX ORDER — DIPHENHYDRAMINE HYDROCHLORIDE 50 MG/ML
25 INJECTION INTRAMUSCULAR; INTRAVENOUS ONCE
Status: COMPLETED | OUTPATIENT
Start: 2022-03-31 | End: 2022-03-31

## 2022-03-31 RX ORDER — DIPHENHYDRAMINE HCL 25 MG
25 CAPSULE ORAL ONCE
Status: COMPLETED | OUTPATIENT
Start: 2022-03-31 | End: 2022-03-31

## 2022-03-31 RX ORDER — ACETAMINOPHEN 160 MG/5ML
650 SOLUTION ORAL ONCE
Status: COMPLETED | OUTPATIENT
Start: 2022-03-31 | End: 2022-03-31

## 2022-03-31 RX ORDER — ACETAMINOPHEN 325 MG/1
650 TABLET ORAL ONCE
Status: COMPLETED | OUTPATIENT
Start: 2022-03-31 | End: 2022-03-31

## 2022-03-31 RX ADMIN — DIPHENHYDRAMINE HYDROCHLORIDE 25 MG: 25 CAPSULE ORAL at 22:56

## 2022-03-31 RX ADMIN — Medication 10 ML: at 22:59

## 2022-03-31 RX ADMIN — MEDROXYPROGESTERONE ACETATE 20 MG: 10 TABLET ORAL at 22:56

## 2022-03-31 RX ADMIN — ACETAMINOPHEN 650 MG: 325 TABLET ORAL at 22:56

## 2022-04-01 ENCOUNTER — READMISSION MANAGEMENT (OUTPATIENT)
Dept: CALL CENTER | Facility: HOSPITAL | Age: 32
End: 2022-04-01

## 2022-04-01 VITALS
BODY MASS INDEX: 39.05 KG/M2 | HEIGHT: 66 IN | WEIGHT: 243 LBS | OXYGEN SATURATION: 100 % | TEMPERATURE: 98.1 F | RESPIRATION RATE: 16 BRPM | HEART RATE: 82 BPM | DIASTOLIC BLOOD PRESSURE: 70 MMHG | SYSTOLIC BLOOD PRESSURE: 124 MMHG

## 2022-04-01 LAB
DEPRECATED RDW RBC AUTO: 55.3 FL (ref 37–54)
ERYTHROCYTE [DISTWIDTH] IN BLOOD BY AUTOMATED COUNT: 21 % (ref 12.3–15.4)
HCT VFR BLD AUTO: 30.6 % (ref 34–46.6)
HGB BLD-MCNC: 9.3 G/DL (ref 12–15.9)
MCH RBC QN AUTO: 22.5 PG (ref 26.6–33)
MCHC RBC AUTO-ENTMCNC: 30.4 G/DL (ref 31.5–35.7)
MCV RBC AUTO: 73.9 FL (ref 79–97)
PLATELET # BLD AUTO: 279 10*3/MM3 (ref 140–450)
PMV BLD AUTO: 10.8 FL (ref 6–12)
RBC # BLD AUTO: 4.14 10*6/MM3 (ref 3.77–5.28)
WBC NRBC COR # BLD: 8.61 10*3/MM3 (ref 3.4–10.8)

## 2022-04-01 PROCEDURE — G0378 HOSPITAL OBSERVATION PER HR: HCPCS

## 2022-04-01 PROCEDURE — 36430 TRANSFUSION BLD/BLD COMPNT: CPT

## 2022-04-01 PROCEDURE — 86900 BLOOD TYPING SEROLOGIC ABO: CPT

## 2022-04-01 PROCEDURE — P9016 RBC LEUKOCYTES REDUCED: HCPCS

## 2022-04-01 PROCEDURE — 85027 COMPLETE CBC AUTOMATED: CPT | Performed by: OBSTETRICS & GYNECOLOGY

## 2022-04-01 RX ORDER — FERROUS SULFATE 325(65) MG
325 TABLET ORAL 2 TIMES DAILY WITH MEALS
Qty: 60 TABLET | Refills: 2 | Status: SHIPPED | OUTPATIENT
Start: 2022-04-01 | End: 2023-01-09 | Stop reason: SDUPTHER

## 2022-04-01 RX ORDER — MEDROXYPROGESTERONE ACETATE 10 MG/1
10 TABLET ORAL DAILY
Qty: 10 TABLET | Refills: 1 | Status: SHIPPED | OUTPATIENT
Start: 2022-04-01 | End: 2022-11-21

## 2022-04-01 RX ADMIN — MEDROXYPROGESTERONE ACETATE 20 MG: 10 TABLET ORAL at 10:32

## 2022-04-01 RX ADMIN — Medication 10 ML: at 09:00

## 2022-04-01 NOTE — PLAN OF CARE
Goal Outcome Evaluation:           Progress: improving  Outcome Evaluation: VSS on RA. Vaginal bleeding as improved per patient. 3 units PRBS given during hospitalization. Tolerated all units. No complaints of pain. Tolerating PO. Plan to discharge home.

## 2022-04-01 NOTE — CASE MANAGEMENT/SOCIAL WORK
Continued Stay Note  [facility]     Patient Name: Ingrid Boykin  MRN: [id number]  Today's Date: 4/1/2022    Admit Date: 3/31/2022     Discharge Plan     Row Name 04/01/22 0923       Plan    Plan Home    Patient/Family in Agreement with Plan yes    Final Discharge Disposition Code 01 - home or self-care               Discharge Codes    No documentation.                     Nasra Mart RN

## 2022-04-01 NOTE — PLAN OF CARE
Goal Outcome Evaluation:  Plan of Care Reviewed With: patient        Progress: improving  Outcome Evaluation: VSS. RA. Pt amb well, voiding well, denies nausea, denies pain. Pt tolerated blood transfusion during the night w/o suspected reaction or complaints. Pt calm/cooperative/pleasant.

## 2022-04-01 NOTE — DISCHARGE SUMMARY
Date of Discharge:  4/1/2022    Discharge Diagnosis: Menorrhagia to anemia, improved s/p 3u PRBC    Presenting Problem/History of Present Illness  Menorrhagia with regular cycle [N92.0]       Hospital Course  Patient is a 32 y.o. female nulligravid who presented through the ED yesterday due to known anemia discovered on outside labs earlier this week. She stopped OCPs in January 2022 in hopes to TTC. She has hx of von Willebrand disease. She has not seen a hematologist in years. She experienced heavy bleeding x 3mo since stopping OCPs. H/H on arrival to ED was 5.9/20.9. She was admitted for transfusion of 3u PRBC. She was also given provera 20mg PO TID for cessation of current bleeding. On HD#2 her H/H had improved to 9.3/30.6 after transfusion and bleeding had slowed on provera. She felt well and was deemed stable for discharge home. Will continue provera 10mg PO daily x 10d on discharge. Pt is to follow-up in office in 1-2w to discuss further treatment options at which time we will also establish referral to hematology. She should begin FeSO4 BID on discharge.    Procedures Performed         Consults:   Consults     No orders found for last 30 day(s).          Labs:  Lab Results   Component Value Date    WBC 8.61 04/01/2022    HGB 9.3 (L) 04/01/2022    HCT 30.6 (L) 04/01/2022    MCV 73.9 (L) 04/01/2022     04/01/2022    AST 15 03/31/2022    ALT 10 03/31/2022     Results from last 7 days   Lab Units 03/31/22 2053 03/31/22  1851   ABO TYPING  O O   RH TYPING  Positive Positive   ANTIBODY SCREEN   --  Negative       Discharge Disposition:  To Home    Condition on Discharge:  Stable    Vital Signs  Temp:  [97.6 °F (36.4 °C)-98.9 °F (37.2 °C)] 97.6 °F (36.4 °C)  Heart Rate:  [] 87  Resp:  [14-18] 16  BP: (108-144)/(60-88) 135/74      Discharge Disposition  Home or Self Care    Discharge Medications     Discharge Medications      New Medications      Instructions Start Date   medroxyPROGESTERone 10 MG  tablet  Commonly known as: Provera   10 mg, Oral, Daily         Continue These Medications      Instructions Start Date   mupirocin 2 % ointment  Commonly known as: BACTROBAN   Topical, 3 Times Daily         Stop These Medications    Sprintec 28 0.25-35 MG-MCG per tablet  Generic drug: norgestimate-ethinyl estradiol            Discharge Diet: Regular    Activity at Discharge: No restrictions    Follow-up Appointments  No future appointments.  Additional Instructions for the Follow-ups that You Need to Schedule     Discharge Follow-up with Specified Provider: [facility]; 1 Week   As directed      To: [facility]    Follow Up: 1 Week    Follow Up Details: AUB follow-up               Test Results Pending at Discharge       Valentine Jacobs MD  04/01/22  15:05 EDT    Time: 20 minutes

## 2022-04-01 NOTE — OUTREACH NOTE
Prep Survey    Flowsheet Row Responses   [facility] facility patient discharged from? [place]   Is LACE score < 7 ? Yes   Emergency Room discharge w/ pulse ox? No   Eligibility [facility]   Date of Admission 03/31/22   Date of Discharge 04/01/22   Discharge Disposition Home or Self Care   Discharge diagnosis Menorrhagia to anemia, improved s/p 3u PRBC   Does the patient have one of the following disease processes/diagnoses(primary or secondary)? Other   Does the patient have Home health ordered? No   Is there a DME ordered? No   Prep survey completed? Yes          JAD SEVERINO - Registered Nurse

## 2022-04-01 NOTE — H&P
NOLAN Chahal  GYN History and Physical    Chief Complaint   Patient presents with   • Dizziness       Subjective     Patient is a 32 y.o. female nulligravid who presented through the ED yesterday due to symptomatic anemia. She was seen by NP in office earlier this week for routine annual exam at which time she noted that she had stopped her OCPs in January in hopes to TTC and had been bleeding heavily since that time. H/H was drawn in office and hemoglobin was found to be 6. She c/o shortness of breath and light headedness. PMHx notable for von Willebrand disease. She has not seen a hematologist since was diagnosed as a teen. She denies other significant PMHx.    This morning she is s/p 2u PRBC, states she doesn't feel much different. She has a mild HA. She states her bleeding has slowed a small amount after 1 dose of PO provera.    The following portions of the patients history were reviewed and updated as appropriate: current medications, allergies, past medical history, past surgical history, past family history, past social history and problem list .       Prenatal Information:   Maternal Prenatal Labs  Blood Type ABO Type   Date Value Ref Range Status   03/31/2022 O  Final      Rh Status RH type   Date Value Ref Range Status   03/31/2022 Positive  Final      Antibody Screen Antibody Screen   Date Value Ref Range Status   03/31/2022 Negative  Final                Past OB History:       OB History   No obstetric history on file.       Past Medical History: Past Medical History:   Diagnosis Date   • Anemia    • Asthma    • GERD (gastroesophageal reflux disease)       Past Surgical History Past Surgical History:   Procedure Laterality Date   • WISDOM TOOTH EXTRACTION  2012      Family History: Family History   Problem Relation Age of Onset   • Diabetes Mother    • Hypertension Mother    • Hyperlipidemia Mother    • Hypertension Father    • Diabetes Maternal Grandmother    • Heart attack Maternal Grandfather    •  Ovarian cancer Neg Hx    • Breast cancer Neg Hx    • Colon cancer Neg Hx       Social History:  reports that she has never smoked. She has never used smokeless tobacco.   reports current alcohol use of about 3.0 standard drinks of alcohol per week.   reports no history of drug use.        General ROS: Pertinent items are noted in HPI    Objective       Vital Signs Range for the last 24 hours  Temperature: Temp:  [97.6 °F (36.4 °C)-98.7 °F (37.1 °C)] 97.6 °F (36.4 °C)   Temp Source: Temp src: Oral   BP: BP: (108-137)/(60-88) 124/71   Pulse: Heart Rate:  [] 76   Respirations: Resp:  [14-18] 16   SPO2: SpO2:  [98 %-100 %] 100 %   O2 Amount (l/min):     O2 Devices Device (Oxygen Therapy): room air   Weight: Weight:  [110 kg (243 lb)] 110 kg (243 lb)               Physical Examination:   General appearance - alert, well appearing, and in no distress  Mental status - alert, oriented to person, place, and time  Chest - normal respiratory effort, no respiratory distress  Heart - regular rate  Abdomen - soft, nontender  Pelvic - exam deferred  Skin - no lesions        Laboratory Results:   Component      Latest Ref Rng & Units 3/31/2022   WBC      3.40 - 10.80 10*3/mm3 8.73   RBC      3.77 - 5.28 10*6/mm3 3.14 (L)   Hemoglobin      12.0 - 15.9 g/dL 5.9 (LL)   Hematocrit      34.0 - 46.6 % 20.9 (LL)   MCV      79.0 - 97.0 fL 66.6 (L)   MCH      26.6 - 33.0 pg 18.8 (L)   MCHC      31.5 - 35.7 g/dL 28.2 (L)   RDW      12.3 - 15.4 % 18.6 (H)   RDW-SD      37.0 - 54.0 fl 43.4   MPV      6.0 - 12.0 fL 11.1   Platelets      140 - 450 10*3/mm3 328   Neutrophil Rel %      42.7 - 76.0 % 47.1   Lymphocyte Rel %      19.6 - 45.3 % 43.1   Monocyte Rel %      5.0 - 12.0 % 6.8   Eosinophil Rel %      0.3 - 6.2 % 2.6   Basophil Rel %      0.0 - 1.5 % 0.2   Immature Granulocyte Rel %      0.0 - 0.5 % 0.2   Neutrophils Absolute      1.70 - 7.00 10*3/mm3 4.11   Lymphocytes Absolute      0.70 - 3.10 10*3/mm3 3.76 (H)   Monocytes  Absolute      0.10 - 0.90 10*3/mm3 0.59   Eosinophils Absolute      0.00 - 0.40 10*3/mm3 0.23   Basophils Absolute      0.00 - 0.20 10*3/mm3 0.02   Immature Grans, Absolute      0.00 - 0.05 10*3/mm3 0.02   nRBC      0.0 - 0.2 /100 WBC 0.3 (H)   Glucose      65 - 99 mg/dL 89   BUN      6 - 20 mg/dL 9   Creatinine      0.57 - 1.00 mg/dL 1.15 (H)   Sodium      136 - 145 mmol/L 140   Potassium      3.5 - 5.2 mmol/L 3.7   Chloride      98 - 107 mmol/L 107   CO2      22.0 - 29.0 mmol/L 24.0   Calcium      8.6 - 10.5 mg/dL 9.1   Total Protein      6.0 - 8.5 g/dL 7.6   Albumin      3.50 - 5.20 g/dL 4.20   ALT (SGPT)      1 - 33 U/L 10   AST (SGOT)      1 - 32 U/L 15   Alkaline Phosphatase      39 - 117 U/L 117   Total Bilirubin      0.0 - 1.2 mg/dL 0.2   Globulin      gm/dL 3.4   A/G Ratio      g/dL 1.2   BUN/Creatinine Ratio      7.0 - 25.0 7.8   Anion Gap      5.0 - 15.0 mmol/L 9.0   eGFR      >60.0 mL/min/1.73 65.9   HCG, Urine QL      Negative Negative   Lot Number       [id number]   Internal Positive Control      Positive, Passed Positive   Internal Negative Control      Negative, Passed Negative   Expiration Date       2023-03-31   Elliptocytes      None Seen Slight/1+   Hypochromia      None Seen Mod/2+   Microcytes      None Seen Mod/2+   WBC Morphology      Normal Normal   Platelet Morphology      Normal Normal     US Non-ob Transvaginal [[id number]] (Order [id number])  Order  Status: Final result         Study Result    US NON-OB TRANSVAGINAL-     Date of Exam: 3/31/2022 7:30 PM     Indication: Menorrhagia, anemia. US NON-OB TRANSVAGINAL-     Date of Exam: 3/31/2022 7:30 PM     Indication: Menorrhagia, anemia.     Comparison: None available.     Technique: Grayscale and color Doppler imaging was performed of the  pelvis.  Transvaginal imaging was performed.      FINDINGS:  Measurements:     Uterus: 7.9 x 3.8 x 4.1 cm.       Endometrial stripe: 17 mm     Right Ovary: 4.8 x 4.4 x 2.8 cm.     Left Ovary: 3.0 x 3.8 x 2.5  cm.     Ultrasound Findings:  Uterus:  Uterus is slightly heterogeneous in appearance. A small 8 mm  fibroid noted at the fundus      Endometrial stripe: Endometrial stripe is slightly heterogeneous in  appearance. Poor differentiation on portions of the marginal zone are  noted.     Right Ovary: Right ovary is within normal limits.  There is normal color  flow.     Left Ovary: Left ovary is within normal limits.  There is normal color  flow.     Free Fluid: No evidence of free fluid.     IMPRESSION:  Small fibroid within the uterus     Slightly heterogeneous appearance of the endometrial stripe which is  mildly thickened. Recommend follow-up in 6-12 weeks to reevaluate.     Ovaries appear unremarkable in appearance     This report was finalized on 3/31/2022 8:07 PM by Franck Schwarz.           Assessment/Plan       Menorrhagia with regular cycle        Assessment:  1.  Menorrhagia to anemia  2.  Von Willebrand disease    Plan:  1. Admit for transfusion of PRBC, s/p 2u overnight with one additional unit to be transfused this AM; provera 20mg PO TID started to slow current prolonged bleeding episode; repeat labs s/p transfusion; SCDs for DVT ppx  2. Plan of care has been reviewed with patient   3.  Risks, benefits of treatment plan have been discussed.  4.  All questions have been answered.        Valentine Jacobs MD  4/1/2022  08:15 EDT

## 2022-04-02 LAB
BH BB BLOOD EXPIRATION DATE: NORMAL
BH BB BLOOD TYPE BARCODE: 5100
BH BB DISPENSE STATUS: NORMAL
BH BB PRODUCT CODE: NORMAL
BH BB UNIT NUMBER: NORMAL
CROSSMATCH INTERPRETATION: NORMAL
UNIT  ABO: NORMAL
UNIT  RH: NORMAL

## 2022-04-04 ENCOUNTER — TRANSITIONAL CARE MANAGEMENT TELEPHONE ENCOUNTER (OUTPATIENT)
Dept: CALL CENTER | Facility: HOSPITAL | Age: 32
End: 2022-04-04

## 2022-04-04 LAB
QT INTERVAL: 344 MS
QTC INTERVAL: 448 MS

## 2022-04-04 NOTE — OUTREACH NOTE
Call Center TCM Note    Flowsheet Row Responses   [facility] patient discharged from? [place]   Does the patient have one of the following disease processes/diagnoses(primary or secondary)? Other   TCM attempt successful? Yes   Call start time 0853   Call end time 0856   Discharge diagnosis Menorrhagia to anemia, improved s/p 3u PRBC   Meds reviewed with patient/caregiver? Yes   Is the patient having any side effects they believe may be caused by any medication additions or changes? No   Does the patient have all medications ordered at discharge? Yes   Is the patient taking all medications as directed (includes completed medication regime)? Yes   Does the patient have a primary care provider?  Yes   Does the patient have an appointment with their PCP within 7 days of discharge? No   Comments regarding PCP PCP APPOINTMENT SCHEDULED DURING THIS CALL FOR 4/6/22   What is preventing the patient from scheduling follow up appointments within 7 days of discharge? Haven't had time   Nursing Interventions Educated patient on importance of making appointment   Urgent appointment interventions Facilitated patient appointment   Has the patient kept scheduled appointments due by today? N/A   Has home health visited the patient within 72 hours of discharge? N/A   Psychosocial issues? No   Did the patient receive a copy of their discharge instructions? Yes   Nursing interventions Reviewed instructions with patient   What is the patient's perception of their health status since discharge? Improving  [PATIENT IS STILL HAVING A LITTLE VAGINAL BLEEDING, BUT PATIENT STATES IT IS VERY LIGHT. ]   Is the patient/caregiver able to teach back signs and symptoms related to disease process for when to call PCP? Yes   Is the patient/caregiver able to teach back signs and symptoms related to disease process for when to call 911? Yes   Is the patient/caregiver able to teach back the hierarchy of who to call/visit for symptoms/problems?  PCP, Specialist, Home health nurse, Urgent Care, ED, 911 Yes   If the patient is a current smoker, are they able to teach back resources for cessation? Not a smoker   TCM call completed? Yes          Adelaida Gong LPN    4/4/2022, 08:58 EDT

## 2022-04-06 ENCOUNTER — LAB (OUTPATIENT)
Dept: LAB | Facility: HOSPITAL | Age: 32
End: 2022-04-06

## 2022-04-06 ENCOUNTER — OFFICE VISIT (OUTPATIENT)
Dept: FAMILY MEDICINE CLINIC | Facility: CLINIC | Age: 32
End: 2022-04-06

## 2022-04-06 VITALS
SYSTOLIC BLOOD PRESSURE: 124 MMHG | BODY MASS INDEX: 38.99 KG/M2 | WEIGHT: 242.6 LBS | HEART RATE: 86 BPM | TEMPERATURE: 98.7 F | HEIGHT: 66 IN | OXYGEN SATURATION: 98 % | DIASTOLIC BLOOD PRESSURE: 80 MMHG

## 2022-04-06 DIAGNOSIS — D68.00 VON WILLEBRAND DISEASE: ICD-10-CM

## 2022-04-06 DIAGNOSIS — N92.0 MENORRHAGIA WITH REGULAR CYCLE: ICD-10-CM

## 2022-04-06 DIAGNOSIS — D50.9 MICROCYTIC ANEMIA: ICD-10-CM

## 2022-04-06 DIAGNOSIS — Z09 HOSPITAL DISCHARGE FOLLOW-UP: Primary | ICD-10-CM

## 2022-04-06 LAB
DEPRECATED RDW RBC AUTO: 59.5 FL (ref 37–54)
ERYTHROCYTE [DISTWIDTH] IN BLOOD BY AUTOMATED COUNT: 23.6 % (ref 12.3–15.4)
HCT VFR BLD AUTO: 34 % (ref 34–46.6)
HGB BLD-MCNC: 10.5 G/DL (ref 12–15.9)
MCH RBC QN AUTO: 22.5 PG (ref 26.6–33)
MCHC RBC AUTO-ENTMCNC: 30.9 G/DL (ref 31.5–35.7)
MCV RBC AUTO: 72.8 FL (ref 79–97)
PLATELET # BLD AUTO: 285 10*3/MM3 (ref 140–450)
RBC # BLD AUTO: 4.67 10*6/MM3 (ref 3.77–5.28)
WBC NRBC COR # BLD: 8.54 10*3/MM3 (ref 3.4–10.8)

## 2022-04-06 PROCEDURE — 85027 COMPLETE CBC AUTOMATED: CPT

## 2022-04-06 PROCEDURE — 99495 TRANSJ CARE MGMT MOD F2F 14D: CPT | Performed by: PHYSICIAN ASSISTANT

## 2022-04-06 PROCEDURE — 36415 COLL VENOUS BLD VENIPUNCTURE: CPT

## 2022-04-06 NOTE — PROGRESS NOTES
Transitional Care Follow Up Visit  Subjective     Ingrid Boykin is a 32 y.o. female who presents for a transitional care management visit.    Within 48 business hours after discharge our office contacted her via telephone to coordinate her care and needs.      I reviewed and discussed the details of that call along with the discharge summary, hospital problems, inpatient lab results, inpatient diagnostic studies, and consultation reports with Ingrid.     Current outpatient and discharge medications have been reconciled for the patient.    Date of TCM Phone Call 4/1/2022   [facility]   Date of Admission 3/31/2022   Date of Discharge 4/1/2022   Discharge Disposition Home or Self Care     Risk for Readmission (LACE) Score: 1 (4/1/2022  6:01 AM)      Patient is a very pleasant 32-year-old female who presents for routine hospital follow-up.  Patient was recently admitted at [facility] on 3/31-4/1 for anemia secondary to menorrhagia and von Willebrand's disease.  Patient is established with gynecology, Dr. Jacobs.  She received a transfusion of 3u PRBCs.  Her hemoglobin was 5.9 initially and 9.3 at discharge.  She is compliant on the Provera medication started for her menorrhagia.  She reports that she continues to have spotting but no significant bleeding.  She is also compliant on her iron supplementation twice daily and is tolerating this well.  She has ongoing fatigue but her shortness of breath and lightheadedness have improved.  She has an appointment with Dr. Jacobs tomorrow.  Dr. Jacobs's Hospital note, she plans on referring patient to hematology at follow-up appointment.  Overall patient is doing well and has no complaints today.     Duration of Hospital Stay: 03/31 to 04/01     The following portions of the patient's history were reviewed and updated as appropriate: allergies, current medications, past family history, past medical history, past social history, past surgical history and problem list.     Current  outpatient and discharge medications have been reconciled for the patient.  Reviewed by: Marjan Sánchez PA-C      Review of Systems   Constitutional: Positive for fatigue. Negative for chills and fever.   Respiratory: Negative for cough, shortness of breath and wheezing.    Cardiovascular: Negative for chest pain, palpitations and leg swelling.   Genitourinary: Positive for menstrual problem.   Neurological: Negative for dizziness, light-headedness and headache.       Current Outpatient Medications on File Prior to Visit   Medication Sig Dispense Refill   • ferrous sulfate 325 (65 FE) MG tablet Take 1 tablet by mouth 2 (Two) Times a Day With Meals. 60 tablet 2   • medroxyPROGESTERone (Provera) 10 MG tablet Take 1 tablet by mouth Daily. 10 tablet 1   • [DISCONTINUED] mupirocin (BACTROBAN) 2 % ointment Apply  topically to the appropriate area as directed 3 (Three) Times a Day. 30 g 0     No current facility-administered medications on file prior to visit.       Results for orders placed or performed during the hospital encounter of 03/31/22   Comprehensive Metabolic Panel    Specimen: Blood   Result Value Ref Range    Glucose 89 65 - 99 mg/dL    BUN 9 6 - 20 mg/dL    Creatinine 1.15 (H) 0.57 - 1.00 mg/dL    Sodium 140 136 - 145 mmol/L    Potassium 3.7 3.5 - 5.2 mmol/L    Chloride 107 98 - 107 mmol/L    CO2 24.0 22.0 - 29.0 mmol/L    Calcium 9.1 8.6 - 10.5 mg/dL    Total Protein 7.6 6.0 - 8.5 g/dL    Albumin 4.20 3.50 - 5.20 g/dL    ALT (SGPT) 10 1 - 33 U/L    AST (SGOT) 15 1 - 32 U/L    Alkaline Phosphatase 117 39 - 117 U/L    Total Bilirubin 0.2 0.0 - 1.2 mg/dL    Globulin 3.4 gm/dL    A/G Ratio 1.2 g/dL    BUN/Creatinine Ratio 7.8 7.0 - 25.0    Anion Gap 9.0 5.0 - 15.0 mmol/L    eGFR 65.9 >60.0 mL/min/1.73   Urinalysis With Microscopic If Indicated (No Culture) - Urine, Clean Catch    Specimen: Urine, Clean Catch   Result Value Ref Range    Color, UA Yellow Yellow, Straw    Appearance, UA Clear Clear    pH,  UA <=5.0 5.0 - 8.0    Specific Gravity, UA 1.029 1.001 - 1.030    Glucose, UA Negative Negative    Ketones, UA Trace (A) Negative    Bilirubin, UA Negative Negative    Blood, UA Negative Negative    Protein, UA Negative Negative    Leuk Esterase, UA Negative Negative    Nitrite, UA Negative Negative    Urobilinogen, UA 1.0 E.U./dL 0.2 - 1.0 E.U./dL   CBC Auto Differential    Specimen: Blood   Result Value Ref Range    WBC 8.73 3.40 - 10.80 10*3/mm3    RBC 3.14 (L) 3.77 - 5.28 10*6/mm3    Hemoglobin 5.9 (C) 12.0 - 15.9 g/dL    Hematocrit 20.9 (C) 34.0 - 46.6 %    MCV 66.6 (L) 79.0 - 97.0 fL    MCH 18.8 (L) 26.6 - 33.0 pg    MCHC 28.2 (L) 31.5 - 35.7 g/dL    RDW 18.6 (H) 12.3 - 15.4 %    RDW-SD 43.4 37.0 - 54.0 fl    MPV 11.1 6.0 - 12.0 fL    Platelets 328 140 - 450 10*3/mm3    Neutrophil % 47.1 42.7 - 76.0 %    Lymphocyte % 43.1 19.6 - 45.3 %    Monocyte % 6.8 5.0 - 12.0 %    Eosinophil % 2.6 0.3 - 6.2 %    Basophil % 0.2 0.0 - 1.5 %    Immature Grans % 0.2 0.0 - 0.5 %    Neutrophils, Absolute 4.11 1.70 - 7.00 10*3/mm3    Lymphocytes, Absolute 3.76 (H) 0.70 - 3.10 10*3/mm3    Monocytes, Absolute 0.59 0.10 - 0.90 10*3/mm3    Eosinophils, Absolute 0.23 0.00 - 0.40 10*3/mm3    Basophils, Absolute 0.02 0.00 - 0.20 10*3/mm3    Immature Grans, Absolute 0.02 0.00 - 0.05 10*3/mm3    nRBC 0.3 (H) 0.0 - 0.2 /100 WBC   Scan Slide    Specimen: Blood   Result Value Ref Range    Elliptocytes Slight/1+ None Seen    Hypochromia Mod/2+ None Seen    Microcytes Mod/2+ None Seen    WBC Morphology Normal Normal    Platelet Morphology Normal Normal   CBC (No Diff)    Specimen: Blood   Result Value Ref Range    WBC 8.61 3.40 - 10.80 10*3/mm3    RBC 4.14 3.77 - 5.28 10*6/mm3    Hemoglobin 9.3 (L) 12.0 - 15.9 g/dL    Hematocrit 30.6 (L) 34.0 - 46.6 %    MCV 73.9 (L) 79.0 - 97.0 fL    MCH 22.5 (L) 26.6 - 33.0 pg    MCHC 30.4 (L) 31.5 - 35.7 g/dL    RDW 21.0 (H) 12.3 - 15.4 %    RDW-SD 55.3 (H) 37.0 - 54.0 fl    MPV 10.8 6.0 - 12.0 fL     "Platelets 279 140 - 450 10*3/mm3   POCT pregnancy, urine    Specimen: Urine   Result Value Ref Range    HCG, Urine, QL Negative Negative    Lot Number [id number]     Internal Positive Control Positive Positive, Passed    Internal Negative Control Negative Negative, Passed    Expiration Date 2023-03-31    ECG 12 Lead   Result Value Ref Range    QT Interval 344 ms    QTC Interval 448 ms   Type & Screen    Specimen: Blood   Result Value Ref Range    ABO Type O     RH type Positive     Antibody Screen Negative     T&S Expiration Date 4/3/2022 11:59:59 PM    ABO RH Specimen Verification    Specimen: Blood   Result Value Ref Range    ABO Type O     RH type Positive    Prepare RBC, 3 Units   Result Value Ref Range    Product Code O4468A19     Unit Number [id number]     UNIT  ABO O     UNIT  RH POS     Crossmatch Interpretation Compatible     Dispense Status PT     Blood Expiration Date 202204252359     Blood Type Barcode 5100     Product Code F0136K42     Unit Number [id number]     UNIT  ABO O     UNIT  RH POS     Crossmatch Interpretation Compatible     Dispense Status PT     Blood Expiration Date 202204252359     Blood Type Barcode 5100     Product Code P3486H31     Unit Number [id number]     UNIT  ABO O     UNIT  RH POS     Crossmatch Interpretation Compatible     Dispense Status PT     Blood Expiration Date 202204252359     Blood Type Barcode 5100    Green Top (Gel)   Result Value Ref Range    Extra Tube Hold for add-ons.    Lavender Top   Result Value Ref Range    Extra Tube hold for add-on    Gold Top - SST   Result Value Ref Range    Extra Tube Hold for add-ons.    Gray Top   Result Value Ref Range    Extra Tube Hold for add-ons.    Light Blue Top   Result Value Ref Range    Extra Tube hold for add-on        Visit Vitals  /80 (BP Location: Left arm, Patient Position: Sitting, Cuff Size: Adult)   Pulse 86   Temp 98.7 °F (37.1 °C)   Ht 167.6 cm (65.98\")   Wt 110 kg (242 lb 9.6 oz)   LMP 03/31/2022 " (Exact Date)   SpO2 98%   BMI 39.18 kg/m²     Body mass index is 39.18 kg/m².    Objective   Physical Exam  Vitals reviewed.   Constitutional:       General: She is not in acute distress.     Appearance: Normal appearance. She is well-developed. She is obese. She is not ill-appearing or diaphoretic.   HENT:      Head: Normocephalic and atraumatic.   Eyes:      Extraocular Movements: Extraocular movements intact.      Conjunctiva/sclera: Conjunctivae normal.   Cardiovascular:      Rate and Rhythm: Normal rate and regular rhythm.      Heart sounds: Normal heart sounds.   Pulmonary:      Effort: Pulmonary effort is normal.      Breath sounds: Normal breath sounds.   Musculoskeletal:         General: Normal range of motion.      Cervical back: Normal range of motion.      Right lower leg: No edema.      Left lower leg: No edema.   Skin:     General: Skin is warm.      Findings: No erythema or rash.   Neurological:      General: No focal deficit present.      Mental Status: She is alert.   Psychiatric:         Attention and Perception: She is attentive.         Mood and Affect: Mood normal.         Speech: Speech normal.         Behavior: Behavior normal. Behavior is cooperative.         Thought Content: Thought content normal.         Judgment: Judgment normal.         Assessment/Plan   Diagnoses and all orders for this visit:    1. Hospital discharge follow-up (Primary)  Anemia secondary to menorrhagia and Von Willebrand disease.  Hospitalized from 03/31 to 04/01.  Initial hemoglobin 5.9, discharge hemoglobin 9.3.  Repeat CBC no diff today.    2. Microcytic anemia  -     CBC No Differential; Future  Continue on iron supplementation BID.    3. Von Willebrand disease (HCC)  Per Dr. Jacobs's note in hospital, she plans on making referral to hematology.    4. Menorrhagia with regular cycle  On Provera 10 mg daily.  Light spotting, no heavy bleeding.  Has appointment with Dr. Jacobs tomorrow for follow-up and to discuss  treatment options.

## 2022-05-02 ENCOUNTER — LAB (OUTPATIENT)
Dept: LAB | Facility: HOSPITAL | Age: 32
End: 2022-05-02

## 2022-05-02 ENCOUNTER — CONSULT (OUTPATIENT)
Dept: ONCOLOGY | Facility: CLINIC | Age: 32
End: 2022-05-02

## 2022-05-02 VITALS
OXYGEN SATURATION: 99 % | SYSTOLIC BLOOD PRESSURE: 148 MMHG | WEIGHT: 245 LBS | RESPIRATION RATE: 18 BRPM | HEART RATE: 89 BPM | TEMPERATURE: 98.2 F | DIASTOLIC BLOOD PRESSURE: 90 MMHG | HEIGHT: 66 IN | BODY MASS INDEX: 39.37 KG/M2

## 2022-05-02 DIAGNOSIS — D50.0 IRON DEFICIENCY ANEMIA DUE TO CHRONIC BLOOD LOSS: ICD-10-CM

## 2022-05-02 DIAGNOSIS — D68.00 VON WILLEBRAND DISEASE: ICD-10-CM

## 2022-05-02 DIAGNOSIS — D68.00 VON WILLEBRAND DISEASE: Primary | ICD-10-CM

## 2022-05-02 PROBLEM — D64.9 ANEMIA: Status: ACTIVE | Noted: 2022-05-02

## 2022-05-02 LAB
ALBUMIN SERPL-MCNC: 4.3 G/DL (ref 3.5–5.2)
ALBUMIN/GLOB SERPL: 1.4 G/DL
ALP SERPL-CCNC: 103 U/L (ref 39–117)
ALT SERPL W P-5'-P-CCNC: 16 U/L (ref 1–33)
ANION GAP SERPL CALCULATED.3IONS-SCNC: 9 MMOL/L (ref 5–15)
AST SERPL-CCNC: 19 U/L (ref 1–32)
BILIRUB SERPL-MCNC: 0.2 MG/DL (ref 0–1.2)
BUN SERPL-MCNC: 9 MG/DL (ref 6–20)
BUN/CREAT SERPL: 8.8 (ref 7–25)
CALCIUM SPEC-SCNC: 9.2 MG/DL (ref 8.6–10.5)
CHLORIDE SERPL-SCNC: 106 MMOL/L (ref 98–107)
CO2 SERPL-SCNC: 24 MMOL/L (ref 22–29)
CREAT SERPL-MCNC: 1.02 MG/DL (ref 0.57–1)
EGFRCR SERPLBLD CKD-EPI 2021: 76.1 ML/MIN/1.73
ERYTHROCYTE [DISTWIDTH] IN BLOOD BY AUTOMATED COUNT: 24.9 % (ref 12.3–15.4)
FERRITIN SERPL-MCNC: 46.2 NG/ML (ref 13–150)
GLOBULIN UR ELPH-MCNC: 3.1 GM/DL
GLUCOSE SERPL-MCNC: 96 MG/DL (ref 65–99)
HCT VFR BLD AUTO: 36.1 % (ref 34–46.6)
HGB BLD-MCNC: 10.6 G/DL (ref 12–15.9)
IRON 24H UR-MRATE: 52 MCG/DL (ref 37–145)
IRON SATN MFR SERPL: 14 % (ref 20–50)
LYMPHOCYTES # BLD AUTO: 3.1 10*3/MM3 (ref 0.7–3.1)
LYMPHOCYTES NFR BLD AUTO: 36.9 % (ref 19.6–45.3)
MCH RBC QN AUTO: 22.1 PG (ref 26.6–33)
MCHC RBC AUTO-ENTMCNC: 29.4 G/DL (ref 31.5–35.7)
MCV RBC AUTO: 75.2 FL (ref 79–97)
MONOCYTES # BLD AUTO: 0.2 10*3/MM3 (ref 0.1–0.9)
MONOCYTES NFR BLD AUTO: 2.7 % (ref 5–12)
NEUTROPHILS NFR BLD AUTO: 5 10*3/MM3 (ref 1.7–7)
NEUTROPHILS NFR BLD AUTO: 60.4 % (ref 42.7–76)
PLATELET # BLD AUTO: 325 10*3/MM3 (ref 140–450)
PMV BLD AUTO: 9.7 FL (ref 6–12)
POTASSIUM SERPL-SCNC: 4 MMOL/L (ref 3.5–5.2)
PROT SERPL-MCNC: 7.4 G/DL (ref 6–8.5)
RBC # BLD AUTO: 4.81 10*6/MM3 (ref 3.77–5.28)
SODIUM SERPL-SCNC: 139 MMOL/L (ref 136–145)
TIBC SERPL-MCNC: 373 MCG/DL (ref 298–536)
TRANSFERRIN SERPL-MCNC: 250 MG/DL (ref 200–360)
WBC NRBC COR # BLD: 8.3 10*3/MM3 (ref 3.4–10.8)

## 2022-05-02 PROCEDURE — 82728 ASSAY OF FERRITIN: CPT

## 2022-05-02 PROCEDURE — 85245 CLOT FACTOR VIII VW RISTOCTN: CPT

## 2022-05-02 PROCEDURE — 84466 ASSAY OF TRANSFERRIN: CPT

## 2022-05-02 PROCEDURE — 85240 CLOT FACTOR VIII AHG 1 STAGE: CPT

## 2022-05-02 PROCEDURE — 85025 COMPLETE CBC W/AUTO DIFF WBC: CPT

## 2022-05-02 PROCEDURE — 85246 CLOT FACTOR VIII VW ANTIGEN: CPT

## 2022-05-02 PROCEDURE — 36415 COLL VENOUS BLD VENIPUNCTURE: CPT

## 2022-05-02 PROCEDURE — 99204 OFFICE O/P NEW MOD 45 MIN: CPT | Performed by: INTERNAL MEDICINE

## 2022-05-02 PROCEDURE — 80053 COMPREHEN METABOLIC PANEL: CPT

## 2022-05-02 PROCEDURE — 85247 CLOT FACTOR VIII MULTIMETRIC: CPT

## 2022-05-02 PROCEDURE — 83540 ASSAY OF IRON: CPT

## 2022-05-02 RX ORDER — DESMOPRESSIN ACETATE 0.1 MG/ML
1 SOLUTION NASAL DAILY PRN
Qty: 5 ML | Refills: 12 | Status: SHIPPED | OUTPATIENT
Start: 2022-05-02

## 2022-05-02 NOTE — PROGRESS NOTES
New Patient Office Visit      Date: 2022     Patient Name: Ingrid Boykin  MRN: [id number]  : 1990  Referring Physician: Marjan Sánchez    Chief Complaint: Establish care for von Willebrand's disease    History of Present Illness: Ingrid Boykin is a pleasant 32 y.o. female past medical history of von Willebrand's disease and iron deficiency anemia who presents today for evaluation of follow gross disease. The patient states she was diagnosed with von Willebrand's disease at  when she was 13 years old when she initially presented with significant vaginal bleeding.  She states she was given a trial test of desmopressin with improvement of her symptoms.  She is unclear the subtype of von Willebrand's disease that she has.  She has not been on any medications since that time.  She continues to have abnormal and heavy menstrual cycles.  States that she has been on multiple birth control agents with no significant improvement.  She was recently hospitalized for heavy vaginal bleeding in 2022 when she had a hemoglobin of 5.9.  She was transfused multiple units PRBC with improvement of her hemoglobin and symptoms.  She is currently on oral iron and Provera and tolerating well    Oncology History:    Oncology/Hematology History    No history exists.       Subjective      Review of Systems:     Constitutional: Negative for fevers, chills, or weight loss  Eyes: Negative for blurred vision or discharge         Ear/Nose/Throat: Negative for difficulty swallowing, sore throat, LAD                                                       Respiratory: Negative for cough, SOA, wheezing                                                                                        Cardiovascular: Negative for chest pain or palpitations                                                                  Gastrointestinal: Negative for nausea, vomiting or diarrhea                                                                      Genitourinary: Negative for dysuria or hematuria                                                                                           Musculoskeletal: Negative for any joint pains or muscle aches                                                                        Neurologic: Negative for any weakness, headaches, dizziness                                                                         Hematologic: Negative for any easy bleeding or bruising                                                                                   Psychiatric: Negative for anxiety or depression                             Past Medical History:   Past Medical History:   Diagnosis Date   • Anemia    • Asthma    • Clotting disorder (HCC)    • GERD (gastroesophageal reflux disease)        Past Surgical History:   Past Surgical History:   Procedure Laterality Date   • WISDOM TOOTH EXTRACTION  2012       Family History:   Family History   Problem Relation Age of Onset   • Diabetes Mother    • Hypertension Mother    • Hyperlipidemia Mother    • Hypertension Father    • Diabetes Maternal Grandmother    • Heart attack Maternal Grandfather    • Ovarian cancer Neg Hx    • Breast cancer Neg Hx    • Colon cancer Neg Hx        Social History:   Social History     Socioeconomic History   • Marital status:    Tobacco Use   • Smoking status: Never Smoker   • Smokeless tobacco: Never Used   Vaping Use   • Vaping Use: Never used   Substance and Sexual Activity   • Alcohol use: Yes     Alcohol/week: 3.0 standard drinks     Types: 3 Glasses of wine per week   • Drug use: Never   • Sexual activity: Yes     Partners: Male     Birth control/protection: None       Medications:     Current Outpatient Medications:   •  desmopressin (DDAVP NASAL) 0.01 % solution, 1 spray into the nostril(s) as directed by provider Daily As Needed (bleeding)., Disp: 5 mL, Rfl: 12  •  ferrous sulfate 325 (65 FE) MG tablet, Take 1 tablet by mouth 2 (Two) Times  "a Day With Meals., Disp: 60 tablet, Rfl: 2  •  medroxyPROGESTERone (Provera) 10 MG tablet, Take 1 tablet by mouth Daily., Disp: 10 tablet, Rfl: 1    Allergies:   No Known Allergies    Objective     Physical Exam:  Vital Signs:   Vitals:    05/02/22 1102   BP: 148/90   Pulse: 89   Resp: 18   Temp: 98.2 °F (36.8 °C)   SpO2: 99%   Weight: 111 kg (245 lb)   Height: 167.6 cm (66\")   PainSc: 0-No pain     Pain Score    05/02/22 1102   PainSc: 0-No pain     ECOG Performance Status: 0 - Asymptomatic    Constitutional: NAD, ECOG 0  Eyes: PERRLA, scleral anicteric  ENT: No LAD, no thyromegaly  Respiratory: CTAB, no wheezing, rales, rhonchi  Cardiovascular: RRR, no murmurs, pulses 2+ bilaterally  Abdomen: soft, NT/ND, no HSM  Musculoskeletal: strength 5/5 bilaterally, no c/c/e  Neurologic: A&O x 3, CN II-XII intact grossly  Psych: mood and affect congruent, no SI or HI    Results Review:   No visits with results within 2 Week(s) from this visit.   Latest known visit with results is:   Lab on 04/06/2022   Component Date Value Ref Range Status   • WBC 04/06/2022 8.54  3.40 - 10.80 10*3/mm3 Final   • RBC 04/06/2022 4.67  3.77 - 5.28 10*6/mm3 Final   • Hemoglobin 04/06/2022 10.5 (A) 12.0 - 15.9 g/dL Final   • Hematocrit 04/06/2022 34.0  34.0 - 46.6 % Final   • MCV 04/06/2022 72.8 (A) 79.0 - 97.0 fL Final   • MCH 04/06/2022 22.5 (A) 26.6 - 33.0 pg Final   • MCHC 04/06/2022 30.9 (A) 31.5 - 35.7 g/dL Final   • RDW 04/06/2022 23.6 (A) 12.3 - 15.4 % Final   • RDW-SD 04/06/2022 59.5 (A) 37.0 - 54.0 fl Final   • Platelets 04/06/2022 285  140 - 450 10*3/mm3 Final       No results found.    Assessment / Plan      Assessment/Plan:   1. Von Willebrand disease (HCC) (Primary)  -Per patient she was diagnosed when she was 13 years old  -We will attempt to find records in [facility] system  -We will order labs as below to figure out the subtype  -Have ordered prophylactic desmopressin intranasally today  -     Factor 8 Activity; Future  -     Von " Willebrand Antigen; Future  -     Von Willebrand Factor Activity; Future  -     Von Willebrand Multimeric; Future    2. Iron deficiency anemia due to chronic blood loss  -Secondary to heavy vaginal bleeding from her von Willebrand's disease  -Currently on oral iron and tolerating well  -We will check iron studies today  -     CBC & Differential; Future  -     Comprehensive Metabolic Panel; Future  -     Iron Profile; Future  -     Ferritin; Future    Other orders  -     desmopressin (DDAVP NASAL) 0.01 % solution; 1 spray into the nostril(s) as directed by provider Daily As Needed (bleeding).  Dispense: 5 mL; Refill: 12           Follow Up:   Follow-up in 3 weeks     Vito Rodriguez MD  Hematology and Oncology     Please note that portions of this note may have been completed with a voice recognition program. Efforts were made to edit the dictations, but occasionally words are mistranscribed.

## 2022-05-04 LAB
FACT VIII ACT/NOR PPP: 84 % (ref 56–140)
VWF AG ACT/NOR PPP IA: 73 % (ref 50–200)
VWF:RCO ACT/NOR PPP PL AGG: 44 % (ref 50–200)

## 2022-05-18 LAB — VWF MULTIMERS PPP IB: NORMAL

## 2022-05-23 ENCOUNTER — OFFICE VISIT (OUTPATIENT)
Dept: ONCOLOGY | Facility: CLINIC | Age: 32
End: 2022-05-23

## 2022-05-23 VITALS
RESPIRATION RATE: 17 BRPM | OXYGEN SATURATION: 100 % | DIASTOLIC BLOOD PRESSURE: 104 MMHG | WEIGHT: 248.2 LBS | HEART RATE: 102 BPM | SYSTOLIC BLOOD PRESSURE: 151 MMHG | TEMPERATURE: 97.5 F | BODY MASS INDEX: 39.89 KG/M2 | HEIGHT: 66 IN

## 2022-05-23 DIAGNOSIS — D50.0 IRON DEFICIENCY ANEMIA DUE TO CHRONIC BLOOD LOSS: ICD-10-CM

## 2022-05-23 DIAGNOSIS — D68.00 VON WILLEBRAND DISEASE: Primary | ICD-10-CM

## 2022-05-23 PROCEDURE — 99214 OFFICE O/P EST MOD 30 MIN: CPT | Performed by: INTERNAL MEDICINE

## 2022-05-23 NOTE — PROGRESS NOTES
Follow Up Office Visit      Date: 2022     Patient Name: Ingrid Boykin  MRN: [id number]  : 1990  Referring Physician: Marjan Sánchez     Chief Complaint:  Follow-up for von Willebrand's disease and abnormal vaginal bleeding/iron deficiency anemia     History of Present Illness: Ingrid Boykin is a pleasant 32 y.o. female past medical history of von Willebrand's disease and iron deficiency anemia who presents today for evaluation of follow gross disease. The patient states she was diagnosed with von Willebrand's disease at  when she was 13 years old when she initially presented with significant vaginal bleeding.  She states she was given a trial test of desmopressin with improvement of her symptoms.  She is unclear the subtype of von Willebrand's disease that she has.  She has not been on any medications since that time.  She continues to have abnormal and heavy menstrual cycles.  States that she has been on multiple birth control agents with no significant improvement.  She was recently hospitalized for heavy vaginal bleeding in 2022 when she had a hemoglobin of 5.9.  She was transfused multiple units PRBC with improvement of her hemoglobin and symptoms.  She is currently on oral iron and Provera and tolerating well    Interval History:  Presents clinic for follow-up.  Overall doing well at this time.  Denies any new or abnormal vaginal bleeding.  Tolerating her Provera well.  Tolerating oral iron well.  Has not required any as needed desmopressin    Oncology History:    Oncology/Hematology History    No history exists.       Subjective      Review of Systems:   Constitutional: Negative for fevers, chills, or weight loss  Eyes: Negative for blurred vision or discharge         Ear/Nose/Throat: Negative for difficulty swallowing, sore throat, LAD                                                       Respiratory: Negative for cough, SOA, wheezing                                               "                                          Cardiovascular: Negative for chest pain or palpitations                                                                  Gastrointestinal: Negative for nausea, vomiting or diarrhea                                                                     Genitourinary: Negative for dysuria or hematuria                                                                                           Musculoskeletal: Negative for any joint pains or muscle aches                                                                        Neurologic: Negative for any weakness, headaches, dizziness                                                                         Hematologic: Negative for any easy bleeding or bruising                                                                                   Psychiatric: Negative for anxiety or depression                          Past Medical History/Past Surgical History/ Family History/ Social History: Reviewed by me and unchanged from my previous documentation done on May 2022.     Medications:     Current Outpatient Medications:   •  desmopressin (DDAVP NASAL) 0.01 % solution, 1 spray into the nostril(s) as directed by provider Daily As Needed (bleeding)., Disp: 5 mL, Rfl: 12  •  ferrous sulfate 325 (65 FE) MG tablet, Take 1 tablet by mouth 2 (Two) Times a Day With Meals., Disp: 60 tablet, Rfl: 2  •  medroxyPROGESTERone (Provera) 10 MG tablet, Take 1 tablet by mouth Daily., Disp: 10 tablet, Rfl: 1    Allergies:   No Known Allergies    Objective     Physical Exam:  Vital Signs:   Vitals:    05/23/22 0930   BP: (!) 151/104   Pulse: 102   Resp: 17   Temp: 97.5 °F (36.4 °C)   TempSrc: Temporal   SpO2: 100%   Weight: 113 kg (248 lb 3.2 oz)   Height: 167.6 cm (66\")   PainSc: 0-No pain     Pain Score    05/23/22 0930   PainSc: 0-No pain     ECOG Performance Status: 0 - Asymptomatic    Constitutional: NAD, ECOG 0  Eyes: PERRLA, scleral anicteric  ENT: No " LAD, no thyromegaly  Respiratory: CTAB, no wheezing, rales, rhonchi  Cardiovascular: RRR, no murmurs, pulses 2+ bilaterally  Abdomen: soft, NT/ND, no HSM  Musculoskeletal: strength 5/5 bilaterally, no c/c/e  Neurologic: A&O x 3, CN II-XII intact grossly    Results Review:   No visits with results within 2 Week(s) from this visit.   Latest known visit with results is:   Lab on 05/02/2022   Component Date Value Ref Range Status   • Factor VIII Activity 05/02/2022 84  56 - 140 % Final   • Von Willebrand Ag 05/02/2022 73  50 - 200 % Final   • VWF Activity 05/02/2022 44 (A) 50 - 200 % Final    VWF levels vary with ABO blood group with the lowest levels occurring  in patients with type O.  The lower limit of the reference interval in  persons with type O is approximately 40%.  In addition, the VWF:RCo  assay demonstrates significant variability and slightly low values are  commonly seen due to preanalytical and analytical variables.  VWF:RCo may be spuriously low in individuals with certain  polymorphisms in the VWF gene (e.g. [id number]) that alter the binding  of ristocetin to VWF.  These polymorphisms have been reported in 17%  of whites and 63% of  Americans without a history of a bleeding  disorder (Blood.  2010; 116(2):280-286).   • Von Willebrand Multimers 05/02/2022 Comment   Final    VWF multimer analysis demonstrates a normal pattern and  distribution of bands. This is the pattern of multimers  that occurs in normal individuals as well as in those with  type 1 von Willebrand disease (VWD), type 2M and type 2N  VWD. Some acquired von Willebrand syndrome cases may yield  a normal pattern as well.  No additional results available for further interpretation.  This test was developed and its performance characteristics  determined by [employer]  It has not been cleared or approved  by the Food and Drug Administration.   • Glucose 05/02/2022 96  65 - 99 mg/dL Final   • BUN 05/02/2022 9  6 - 20 mg/dL Final   •  Creatinine 05/02/2022 1.02 (A) 0.57 - 1.00 mg/dL Final   • Sodium 05/02/2022 139  136 - 145 mmol/L Final   • Potassium 05/02/2022 4.0  3.5 - 5.2 mmol/L Final    Slight hemolysis detected by analyzer. Results may be affected.   • Chloride 05/02/2022 106  98 - 107 mmol/L Final   • CO2 05/02/2022 24.0  22.0 - 29.0 mmol/L Final   • Calcium 05/02/2022 9.2  8.6 - 10.5 mg/dL Final   • Total Protein 05/02/2022 7.4  6.0 - 8.5 g/dL Final   • Albumin 05/02/2022 4.30  3.50 - 5.20 g/dL Final   • ALT (SGPT) 05/02/2022 16  1 - 33 U/L Final   • AST (SGOT) 05/02/2022 19  1 - 32 U/L Final   • Alkaline Phosphatase 05/02/2022 103  39 - 117 U/L Final   • Total Bilirubin 05/02/2022 0.2  0.0 - 1.2 mg/dL Final   • Globulin 05/02/2022 3.1  gm/dL Final    Calculated Result   • A/G Ratio 05/02/2022 1.4  g/dL Final   • BUN/Creatinine Ratio 05/02/2022 8.8  7.0 - 25.0 Final   • Anion Gap 05/02/2022 9.0  5.0 - 15.0 mmol/L Final   • eGFR 05/02/2022 76.1  >60.0 mL/min/1.73 Final    National Kidney Foundation and American Society of Nephrology (ASN) Task Force recommended calculation based on the Chronic Kidney Disease Epidemiology Collaboration (CKD-EPI) equation refit without adjustment for race.   • Iron 05/02/2022 52  37 - 145 mcg/dL Final   • Iron Saturation 05/02/2022 14 (A) 20 - 50 % Final   • Transferrin 05/02/2022 250  200 - 360 mg/dL Final   • TIBC 05/02/2022 373  298 - 536 mcg/dL Final   • Ferritin 05/02/2022 46.20  13.00 - 150.00 ng/mL Final   • WBC 05/02/2022 8.30  3.40 - 10.80 10*3/mm3 Final   • RBC 05/02/2022 4.81  3.77 - 5.28 10*6/mm3 Final   • Hemoglobin 05/02/2022 10.6 (A) 12.0 - 15.9 g/dL Final   • Hematocrit 05/02/2022 36.1  34.0 - 46.6 % Final   • RDW 05/02/2022 24.9 (A) 12.3 - 15.4 % Final   • MCV 05/02/2022 75.2 (A) 79.0 - 97.0 fL Final   • MCH 05/02/2022 22.1 (A) 26.6 - 33.0 pg Final   • MCHC 05/02/2022 29.4 (A) 31.5 - 35.7 g/dL Final   • MPV 05/02/2022 9.7  6.0 - 12.0 fL Final   • Platelets 05/02/2022 325  140 - 450 10*3/mm3  Final   • Neutrophil % 05/02/2022 60.4  42.7 - 76.0 % Final   • Lymphocyte % 05/02/2022 36.9  19.6 - 45.3 % Final   • Monocyte % 05/02/2022 2.7 (A) 5.0 - 12.0 % Final   • Neutrophils, Absolute 05/02/2022 5.00  1.70 - 7.00 10*3/mm3 Final   • Lymphocytes, Absolute 05/02/2022 3.10  0.70 - 3.10 10*3/mm3 Final   • Monocytes, Absolute 05/02/2022 0.20  0.10 - 0.90 10*3/mm3 Final       No results found.    Assessment / Plan      Assessment/Plan:   1. Von Willebrand disease (HCC) (Primary)  -Per patient she was diagnosed when she was 13 years old  -Unable to find labs in [facility] system  -Factor VIII activity within normal limits  -Multimer studies within normal limits  -Von Willebrand antigen level 73 with an activity level of 44  -Labs today not significantly diagnostic for von Willebrand's disease.  As she has type of blood she can have slightly lower von Willebrand activity levels which is normal.  -Would consider prophylactic desmopressin for any major surgical procedure given her history of abnormal bleeding     2. Iron deficiency anemia due to chronic blood loss  -Secondary to heavy vaginal bleeding from her von Willebrand's disease  -Currently on oral iron and tolerating well  -Iron studies within normal limits in May 2022  -We will repeat in 6 months    3.  Abnormal vaginal bleeding  -Unclear etiology.  Could be hormonal vs another platelet function disorder  -Iron studies and hemoglobin stable today  -Okay for as needed desmopressin  -Continue Provera    Follow Up:   Follow-up in 6 months     Vito Rodriguez MD  Hematology and Oncology     Please note that portions of this note may have been completed with a voice recognition program. Efforts were made to edit the dictations, but occasionally words are mistranscribed.

## 2022-11-21 ENCOUNTER — LAB (OUTPATIENT)
Dept: LAB | Facility: HOSPITAL | Age: 32
End: 2022-11-21

## 2022-11-21 ENCOUNTER — OFFICE VISIT (OUTPATIENT)
Dept: ONCOLOGY | Facility: CLINIC | Age: 32
End: 2022-11-21

## 2022-11-21 VITALS
TEMPERATURE: 98.4 F | HEIGHT: 66 IN | DIASTOLIC BLOOD PRESSURE: 97 MMHG | OXYGEN SATURATION: 98 % | BODY MASS INDEX: 35.03 KG/M2 | RESPIRATION RATE: 18 BRPM | WEIGHT: 218 LBS | HEART RATE: 86 BPM | SYSTOLIC BLOOD PRESSURE: 136 MMHG

## 2022-11-21 DIAGNOSIS — D50.0 IRON DEFICIENCY ANEMIA DUE TO CHRONIC BLOOD LOSS: Primary | ICD-10-CM

## 2022-11-21 DIAGNOSIS — D68.00 VON WILLEBRAND DISEASE: ICD-10-CM

## 2022-11-21 DIAGNOSIS — D50.0 IRON DEFICIENCY ANEMIA DUE TO CHRONIC BLOOD LOSS: ICD-10-CM

## 2022-11-21 LAB
BASOPHILS # BLD AUTO: 0.01 10*3/MM3 (ref 0–0.2)
BASOPHILS NFR BLD AUTO: 0.1 % (ref 0–1.5)
DEPRECATED RDW RBC AUTO: 42.2 FL (ref 37–54)
EOSINOPHIL # BLD AUTO: 0.35 10*3/MM3 (ref 0–0.4)
EOSINOPHIL NFR BLD AUTO: 5 % (ref 0.3–6.2)
ERYTHROCYTE [DISTWIDTH] IN BLOOD BY AUTOMATED COUNT: 15.2 % (ref 12.3–15.4)
FERRITIN SERPL-MCNC: 11.39 NG/ML (ref 13–150)
HCT VFR BLD AUTO: 39.5 % (ref 34–46.6)
HGB BLD-MCNC: 12.4 G/DL (ref 12–15.9)
IMM GRANULOCYTES # BLD AUTO: 0.01 10*3/MM3 (ref 0–0.05)
IMM GRANULOCYTES NFR BLD AUTO: 0.1 % (ref 0–0.5)
IRON 24H UR-MRATE: 29 MCG/DL (ref 37–145)
IRON SATN MFR SERPL: 6 % (ref 20–50)
LYMPHOCYTES # BLD AUTO: 3.29 10*3/MM3 (ref 0.7–3.1)
LYMPHOCYTES NFR BLD AUTO: 47.1 % (ref 19.6–45.3)
MCH RBC QN AUTO: 23.8 PG (ref 26.6–33)
MCHC RBC AUTO-ENTMCNC: 31.4 G/DL (ref 31.5–35.7)
MCV RBC AUTO: 75.8 FL (ref 79–97)
MONOCYTES # BLD AUTO: 0.4 10*3/MM3 (ref 0.1–0.9)
MONOCYTES NFR BLD AUTO: 5.7 % (ref 5–12)
NEUTROPHILS NFR BLD AUTO: 2.93 10*3/MM3 (ref 1.7–7)
NEUTROPHILS NFR BLD AUTO: 42 % (ref 42.7–76)
PLATELET # BLD AUTO: 317 10*3/MM3 (ref 140–450)
PMV BLD AUTO: 11.6 FL (ref 6–12)
RBC # BLD AUTO: 5.21 10*6/MM3 (ref 3.77–5.28)
TIBC SERPL-MCNC: 484 MCG/DL (ref 298–536)
TRANSFERRIN SERPL-MCNC: 325 MG/DL (ref 200–360)
WBC NRBC COR # BLD: 6.99 10*3/MM3 (ref 3.4–10.8)

## 2022-11-21 PROCEDURE — 83540 ASSAY OF IRON: CPT

## 2022-11-21 PROCEDURE — 84466 ASSAY OF TRANSFERRIN: CPT

## 2022-11-21 PROCEDURE — 82728 ASSAY OF FERRITIN: CPT

## 2022-11-21 PROCEDURE — 85025 COMPLETE CBC W/AUTO DIFF WBC: CPT

## 2022-11-21 PROCEDURE — 36415 COLL VENOUS BLD VENIPUNCTURE: CPT

## 2022-11-21 PROCEDURE — 99214 OFFICE O/P EST MOD 30 MIN: CPT | Performed by: INTERNAL MEDICINE

## 2022-11-21 RX ORDER — METFORMIN HYDROCHLORIDE 500 MG/1
TABLET, EXTENDED RELEASE ORAL
COMMUNITY
Start: 2022-08-24

## 2022-11-21 NOTE — PROGRESS NOTES
Follow Up Office Visit      Date: 2022     Patient Name: Ingrid Boykin  MRN: [id number]  : 1990  Referring Physician: Marjan Sánchez     Chief Complaint:  Follow-up for von Willebrand's disease and abnormal vaginal bleeding/iron deficiency anemia     History of Present Illness: Ingrid Boykin is a pleasant 32 y.o. female past medical history of von Willebrand's disease and iron deficiency anemia who presents today for evaluation of follow gross disease. The patient states she was diagnosed with von Willebrand's disease at  when she was 13 years old when she initially presented with significant vaginal bleeding.  She states she was given a trial test of desmopressin with improvement of her symptoms.  She is unclear the subtype of von Willebrand's disease that she has.  She has not been on any medications since that time.  She continues to have abnormal and heavy menstrual cycles.  States that she has been on multiple birth control agents with no significant improvement.  She was recently hospitalized for heavy vaginal bleeding in 2022 when she had a hemoglobin of 5.9.  She was transfused multiple units PRBC with improvement of her hemoglobin and symptoms.  She is currently on oral iron and Provera and tolerating well     Interval History:  Presents clinic for follow-up.  Overall stable.  Was taken off her birth control for a few months and started to have excessive bleeding.  This was not significant controlled with one-time dose of desmopressin.  She was then placed on birth control for 3 months with improvement of her bleeding.  She has been off birth control for 1 week at this time.  Continues to have close follow-up with her GYN    Oncology History:    Oncology/Hematology History    No history exists.       Subjective      Review of Systems:   Constitutional: Negative for fevers, chills, or weight loss  Eyes: Negative for blurred vision or discharge         Ear/Nose/Throat: Negative  for difficulty swallowing, sore throat, LAD                                                       Respiratory: Negative for cough, SOA, wheezing                                                                                        Cardiovascular: Negative for chest pain or palpitations                                                                  Gastrointestinal: Negative for nausea, vomiting or diarrhea                                                                     Genitourinary: Negative for dysuria or hematuria                                                                                           Musculoskeletal: Negative for any joint pains or muscle aches                                                                        Neurologic: Negative for any weakness, headaches, dizziness                                                                         Hematologic: Negative for any easy bleeding or bruising                                                                                   Psychiatric: Negative for anxiety or depression                          Past Medical History/Past Surgical History/ Family History/ Social History: Reviewed by me and unchanged from my previous documentation done on May 2022.     Medications:     Current Outpatient Medications:   •  desmopressin (DDAVP NASAL) 0.01 % solution, 1 spray into the nostril(s) as directed by provider Daily As Needed (bleeding)., Disp: 5 mL, Rfl: 12  •  ferrous sulfate 325 (65 FE) MG tablet, Take 1 tablet by mouth 2 (Two) Times a Day With Meals., Disp: 60 tablet, Rfl: 2  •  metFORMIN ER (GLUCOPHAGE-XR) 500 MG 24 hr tablet, TAKE 1 TABLET BY MOUTH EVERY DAY WITH LAST MEAL 90, Disp: , Rfl:     Allergies:   No Known Allergies    Objective     Physical Exam:  Vital Signs:   Vitals:    11/21/22 0939   BP: 136/97  Comment: UJLIO   Pulse: 86   Resp: 18   Temp: 98.4 °F (36.9 °C)   TempSrc: Infrared   SpO2: 98%  Comment: DESTINY   Weight: 98.9 kg (218  "lb)   Height: 167.6 cm (66\")   PainSc: 0-No pain     Pain Score    11/21/22 0939   PainSc: 0-No pain     ECOG Performance Status: 0 - Asymptomatic    Constitutional: NAD, ECOG 0  Eyes: PERRLA, scleral anicteric  ENT: No LAD, no thyromegaly  Respiratory: CTAB, no wheezing, rales, rhonchi  Cardiovascular: RRR, no murmurs, pulses 2+ bilaterally  Abdomen: soft, NT/ND, no HSM  Musculoskeletal: strength 5/5 bilaterally, no c/c/e  Neurologic: A&O x 3, CN II-XII intact grossly    Results Review:   Lab on 11/21/2022   Component Date Value Ref Range Status   • WBC 11/21/2022 6.99  3.40 - 10.80 10*3/mm3 Final   • RBC 11/21/2022 5.21  3.77 - 5.28 10*6/mm3 Final   • Hemoglobin 11/21/2022 12.4  12.0 - 15.9 g/dL Final   • Hematocrit 11/21/2022 39.5  34.0 - 46.6 % Final   • MCV 11/21/2022 75.8 (L)  79.0 - 97.0 fL Final   • MCH 11/21/2022 23.8 (L)  26.6 - 33.0 pg Final   • MCHC 11/21/2022 31.4 (L)  31.5 - 35.7 g/dL Final   • RDW 11/21/2022 15.2  12.3 - 15.4 % Final   • RDW-SD 11/21/2022 42.2  37.0 - 54.0 fl Final   • MPV 11/21/2022 11.6  6.0 - 12.0 fL Final   • Platelets 11/21/2022 317  140 - 450 10*3/mm3 Final   • Neutrophil % 11/21/2022 42.0 (L)  42.7 - 76.0 % Final   • Lymphocyte % 11/21/2022 47.1 (H)  19.6 - 45.3 % Final   • Monocyte % 11/21/2022 5.7  5.0 - 12.0 % Final   • Eosinophil % 11/21/2022 5.0  0.3 - 6.2 % Final   • Basophil % 11/21/2022 0.1  0.0 - 1.5 % Final   • Immature Grans % 11/21/2022 0.1  0.0 - 0.5 % Final   • Neutrophils, Absolute 11/21/2022 2.93  1.70 - 7.00 10*3/mm3 Final   • Lymphocytes, Absolute 11/21/2022 3.29 (H)  0.70 - 3.10 10*3/mm3 Final   • Monocytes, Absolute 11/21/2022 0.40  0.10 - 0.90 10*3/mm3 Final   • Eosinophils, Absolute 11/21/2022 0.35  0.00 - 0.40 10*3/mm3 Final   • Basophils, Absolute 11/21/2022 0.01  0.00 - 0.20 10*3/mm3 Final   • Immature Grans, Absolute 11/21/2022 0.01  0.00 - 0.05 10*3/mm3 Final       No results found.    Assessment / Plan      Assessment/Plan:   1. Von Willebrand " disease (HCC) (Primary)  -Per patient she was diagnosed when she was 13 years old  -Unable to find labs in [facility] system  -Factor VIII activity within normal limits  -Multimer studies within normal limits  -Von Willebrand antigen level 73 with an activity level of 44  -Labs previously not significantly diagnostic for von Willebrand's disease.    -As she has type O blood she can have slightly lower von Willebrand activity levels which is normal.  -Would consider prophylactic desmopressin for any major surgical procedure given her history of abnormal bleeding     2. Iron deficiency anemia due to chronic blood loss  -Secondary to heavy vaginal bleeding from her von Willebrand's disease  -Currently on oral iron and tolerating well  -Iron studies within normal limits in May 2022  -Hemoglobin 12.4 in November 2022.  Iron studies pending  -We will repeat labs in 1 year     3.  Abnormal vaginal bleeding  -Continue following with GYN  -Iron studies and hemoglobin stable today  -Okay for as needed desmopressin         Follow Up:   Follow-up in 1 year     Vito Rodriguez MD  Hematology and Oncology     Please note that portions of this note may have been completed with a voice recognition program. Efforts were made to edit the dictations, but occasionally words are mistranscribed.

## 2022-12-14 ENCOUNTER — LAB (OUTPATIENT)
Dept: LAB | Facility: HOSPITAL | Age: 32
End: 2022-12-14

## 2022-12-14 ENCOUNTER — OFFICE VISIT (OUTPATIENT)
Dept: FAMILY MEDICINE CLINIC | Facility: CLINIC | Age: 32
End: 2022-12-14

## 2022-12-14 ENCOUNTER — HOSPITAL ENCOUNTER (OUTPATIENT)
Dept: GENERAL RADIOLOGY | Facility: HOSPITAL | Age: 32
Discharge: HOME | End: 2022-12-14

## 2022-12-14 VITALS
OXYGEN SATURATION: 98 % | WEIGHT: 221.4 LBS | TEMPERATURE: 98.6 F | HEART RATE: 66 BPM | SYSTOLIC BLOOD PRESSURE: 128 MMHG | HEIGHT: 66 IN | DIASTOLIC BLOOD PRESSURE: 88 MMHG | BODY MASS INDEX: 35.58 KG/M2

## 2022-12-14 DIAGNOSIS — K59.00 CONSTIPATION, UNSPECIFIED CONSTIPATION TYPE: ICD-10-CM

## 2022-12-14 DIAGNOSIS — M25.562 ACUTE PAIN OF LEFT KNEE: ICD-10-CM

## 2022-12-14 DIAGNOSIS — Z13.220 SCREENING FOR CHOLESTEROL LEVEL: ICD-10-CM

## 2022-12-14 DIAGNOSIS — Z13.1 SCREENING FOR DIABETES MELLITUS: ICD-10-CM

## 2022-12-14 DIAGNOSIS — Z23 IMMUNIZATION DUE: ICD-10-CM

## 2022-12-14 DIAGNOSIS — R40.0 DAYTIME SOMNOLENCE: ICD-10-CM

## 2022-12-14 DIAGNOSIS — D50.0 IRON DEFICIENCY ANEMIA DUE TO CHRONIC BLOOD LOSS: ICD-10-CM

## 2022-12-14 DIAGNOSIS — G47.9 RESTLESS SLEEPER: ICD-10-CM

## 2022-12-14 DIAGNOSIS — R10.32 LLQ PAIN: ICD-10-CM

## 2022-12-14 DIAGNOSIS — Z00.00 PHYSICAL EXAM, ANNUAL: Primary | ICD-10-CM

## 2022-12-14 DIAGNOSIS — G47.00 INSOMNIA, UNSPECIFIED TYPE: ICD-10-CM

## 2022-12-14 DIAGNOSIS — D68.00 VON WILLEBRAND DISEASE: ICD-10-CM

## 2022-12-14 DIAGNOSIS — N92.0 MENORRHAGIA WITH REGULAR CYCLE: ICD-10-CM

## 2022-12-14 LAB
ALBUMIN SERPL-MCNC: 4.3 G/DL (ref 3.5–5.2)
ALBUMIN/GLOB SERPL: 1.3 G/DL
ALP SERPL-CCNC: 93 U/L (ref 39–117)
ALT SERPL W P-5'-P-CCNC: 11 U/L (ref 1–33)
ANION GAP SERPL CALCULATED.3IONS-SCNC: 8 MMOL/L (ref 5–15)
AST SERPL-CCNC: 18 U/L (ref 1–32)
BILIRUB BLD-MCNC: NEGATIVE MG/DL
BILIRUB SERPL-MCNC: 0.3 MG/DL (ref 0–1.2)
BUN SERPL-MCNC: 10 MG/DL (ref 6–20)
BUN/CREAT SERPL: 9.5 (ref 7–25)
CALCIUM SPEC-SCNC: 9 MG/DL (ref 8.6–10.5)
CHLORIDE SERPL-SCNC: 103 MMOL/L (ref 98–107)
CHOLEST SERPL-MCNC: 211 MG/DL (ref 0–200)
CLARITY, POC: CLEAR
CO2 SERPL-SCNC: 26 MMOL/L (ref 22–29)
COLOR UR: YELLOW
CREAT SERPL-MCNC: 1.05 MG/DL (ref 0.57–1)
CRP SERPL-MCNC: 0.97 MG/DL (ref 0–0.5)
EGFRCR SERPLBLD CKD-EPI 2021: 73.5 ML/MIN/1.73
EXPIRATION DATE: ABNORMAL
GLOBULIN UR ELPH-MCNC: 3.3 GM/DL
GLUCOSE SERPL-MCNC: 91 MG/DL (ref 65–99)
GLUCOSE UR STRIP-MCNC: NEGATIVE MG/DL
HDLC SERPL-MCNC: 60 MG/DL (ref 40–60)
KETONES UR QL: NEGATIVE
LDLC SERPL CALC-MCNC: 137 MG/DL (ref 0–100)
LDLC/HDLC SERPL: 2.26 {RATIO}
LEUKOCYTE EST, POC: NEGATIVE
Lab: ABNORMAL
NITRITE UR-MCNC: NEGATIVE MG/ML
PH UR: 6.5 [PH] (ref 5–8)
POTASSIUM SERPL-SCNC: 4 MMOL/L (ref 3.5–5.2)
PROT SERPL-MCNC: 7.6 G/DL (ref 6–8.5)
PROT UR STRIP-MCNC: ABNORMAL MG/DL
RBC # UR STRIP: ABNORMAL /UL
SODIUM SERPL-SCNC: 137 MMOL/L (ref 136–145)
SP GR UR: 1.01 (ref 1–1.03)
TRIGL SERPL-MCNC: 76 MG/DL (ref 0–150)
URATE SERPL-MCNC: 5.5 MG/DL (ref 2.4–5.7)
UROBILINOGEN UR QL: NORMAL
VLDLC SERPL-MCNC: 14 MG/DL (ref 5–40)

## 2022-12-14 PROCEDURE — 87661 TRICHOMONAS VAGINALIS AMPLIF: CPT | Performed by: PHYSICIAN ASSISTANT

## 2022-12-14 PROCEDURE — 80053 COMPREHEN METABOLIC PANEL: CPT

## 2022-12-14 PROCEDURE — 81003 URINALYSIS AUTO W/O SCOPE: CPT | Performed by: PHYSICIAN ASSISTANT

## 2022-12-14 PROCEDURE — 80061 LIPID PANEL: CPT

## 2022-12-14 PROCEDURE — 90471 IMMUNIZATION ADMIN: CPT | Performed by: PHYSICIAN ASSISTANT

## 2022-12-14 PROCEDURE — 84550 ASSAY OF BLOOD/URIC ACID: CPT

## 2022-12-14 PROCEDURE — 73560 X-RAY EXAM OF KNEE 1 OR 2: CPT

## 2022-12-14 PROCEDURE — 90686 IIV4 VACC NO PRSV 0.5 ML IM: CPT | Performed by: PHYSICIAN ASSISTANT

## 2022-12-14 PROCEDURE — 87798 DETECT AGENT NOS DNA AMP: CPT | Performed by: PHYSICIAN ASSISTANT

## 2022-12-14 PROCEDURE — 86140 C-REACTIVE PROTEIN: CPT

## 2022-12-14 PROCEDURE — 36415 COLL VENOUS BLD VENIPUNCTURE: CPT

## 2022-12-14 PROCEDURE — 87591 N.GONORRHOEAE DNA AMP PROB: CPT | Performed by: PHYSICIAN ASSISTANT

## 2022-12-14 PROCEDURE — 87491 CHLMYD TRACH DNA AMP PROBE: CPT | Performed by: PHYSICIAN ASSISTANT

## 2022-12-14 PROCEDURE — 99395 PREV VISIT EST AGE 18-39: CPT | Performed by: PHYSICIAN ASSISTANT

## 2022-12-14 PROCEDURE — 87801 DETECT AGNT MULT DNA AMPLI: CPT | Performed by: PHYSICIAN ASSISTANT

## 2022-12-14 RX ORDER — DOCUSATE SODIUM 250 MG
250 CAPSULE ORAL DAILY
Qty: 30 CAPSULE | Refills: 2 | Status: SHIPPED | OUTPATIENT
Start: 2022-12-14

## 2022-12-14 NOTE — PROGRESS NOTES
Chief Complaint   Patient presents with   • Annual Exam   • Knee Pain     Pt. States her Left Knee is Painful for the last few months she's noticed a knot.    • Abdominal Pain       Ingrid Boykin is a pleasant 32 y.o. female who is here for annual physical exam.  Patient presents for anemia, menorrhagia, Von Willebrand Disease, PCOS, restless sleeper, LLQ pain, constipation.    Patient is established with gynecology and hematology.  Reviewed recent labs.  Has iron supplements, but not taking regularly.  Recently discontinued birth control.  This has been working well to control her menstrual cycles.  Started on metformin 500 mg for PCOS.  Tolerating metformin well.    Patient reports episodic sharp LLQ pain.  Has 1-2 episodes a month.  Ultrasound with gynecology did not show any concerning findings.  Reports regular bowel movements.  Has hard stools occasionally.  She takes miralax as needed.    Patient reports difficulty staying asleep at night.  Occasionally has trouble falling asleep.  Naps when she can. Has daytime somnolence.  Does not thinks he snores.    She also reports new onset left knee pain.  No trauma/injury that she is aware of.  She reports swelling and tenderness to touch below her knee cap.  Denies heat or redness.  Has improved overall since onset.    Past Medical History:   Diagnosis Date   • Anemia    • Asthma    • Clotting disorder (HCC)    • GERD (gastroesophageal reflux disease)        Past Surgical History:   Procedure Laterality Date   • WISDOM TOOTH EXTRACTION  2012       Family History   Problem Relation Age of Onset   • Diabetes Mother    • Hypertension Mother    • Hyperlipidemia Mother    • Hypertension Father    • Diabetes Maternal Grandmother    • Heart attack Maternal Grandfather    • Ovarian cancer Neg Hx    • Breast cancer Neg Hx    • Colon cancer Neg Hx        Social History     Socioeconomic History   • Marital status:    Tobacco Use   • Smoking status: Never   • Smokeless  "tobacco: Never   Vaping Use   • Vaping Use: Never used   Substance and Sexual Activity   • Alcohol use: Yes     Alcohol/week: 3.0 standard drinks     Types: 3 Glasses of wine per week   • Drug use: Never   • Sexual activity: Yes     Partners: Male     Birth control/protection: None       No Known Allergies    ROS  Review of Systems   Constitutional: Positive for fatigue. Negative for chills, diaphoresis and fever.   HENT: Negative for congestion, ear pain, hearing loss, postnasal drip, rhinorrhea and sore throat.    Eyes: Negative for blurred vision and pain.   Respiratory: Negative for cough, shortness of breath and wheezing.    Cardiovascular: Negative for chest pain and leg swelling.   Gastrointestinal: Positive for abdominal pain and constipation. Negative for blood in stool, diarrhea, nausea, vomiting and indigestion.   Endocrine: Negative for polyuria.   Genitourinary: Positive for vaginal bleeding. Negative for dysuria, flank pain and hematuria.   Musculoskeletal: Positive for arthralgias and bursitis. Negative for gait problem and myalgias.   Skin: Negative for color change, rash and skin lesions.   Neurological: Negative for dizziness, weakness, numbness and headache.   Psychiatric/Behavioral: Positive for sleep disturbance and stress. Negative for self-injury, suicidal ideas and depressed mood. The patient is not nervous/anxious.        Vitals:    12/14/22 0813   BP: 128/88   BP Location: Left arm   Patient Position: Sitting   Cuff Size: Adult   Pulse: 66   Temp: 98.6 °F (37 °C)   TempSrc: Temporal   SpO2: 98%   Weight: 100 kg (221 lb 6.4 oz)   Height: 167.6 cm (65.98\")   PainSc: 0-No pain     Body mass index is 35.75 kg/m².    Class 2 Severe Obesity (BMI >=35 and <=39.9). Obesity-related health conditions include the following: none. Obesity is unchanged. BMI is is above average; BMI management plan is completed. We discussed portion control and increasing exercise.       Current Outpatient Medications on " File Prior to Visit   Medication Sig Dispense Refill   • desmopressin (DDAVP NASAL) 0.01 % solution 1 spray into the nostril(s) as directed by provider Daily As Needed (bleeding). 5 mL 12   • ferrous sulfate 325 (65 FE) MG tablet Take 1 tablet by mouth 2 (Two) Times a Day With Meals. 60 tablet 2   • metFORMIN ER (GLUCOPHAGE-XR) 500 MG 24 hr tablet TAKE 1 TABLET BY MOUTH EVERY DAY WITH LAST MEAL 90       No current facility-administered medications on file prior to visit.       Results for orders placed or performed in visit on 12/14/22   POC Urinalysis Dipstick, Automated    Specimen: Urine   Result Value Ref Range    Color Yellow Yellow, Straw, Dark Yellow, Felisa    Clarity, UA Clear Clear    Specific Gravity  1.015 1.005 - 1.030    pH, Urine 6.5 5.0 - 8.0    Leukocytes Negative Negative    Nitrite, UA Negative Negative    Protein, POC Trace (A) Negative mg/dL    Glucose, UA Negative Negative mg/dL    Ketones, UA Negative Negative    Urobilinogen, UA Normal Normal, 0.2 E.U./dL    Bilirubin Negative Negative    Blood, UA 3+ (A) Negative    Lot Number [id number]     Expiration Date 3/25/24        PE    Physical Exam  Vitals reviewed.   Constitutional:       General: She is not in acute distress.     Appearance: Normal appearance. She is well-developed. She is obese. She is not ill-appearing or diaphoretic.   HENT:      Head: Normocephalic and atraumatic.      Right Ear: Hearing, tympanic membrane, ear canal and external ear normal.      Left Ear: Hearing, tympanic membrane, ear canal and external ear normal.      Nose: Nose normal.      Right Sinus: No maxillary sinus tenderness or frontal sinus tenderness.      Left Sinus: No maxillary sinus tenderness or frontal sinus tenderness.      Mouth/Throat:      Comments: Mallampati IV.  Eyes:      General: Lids are normal.      Extraocular Movements: Extraocular movements intact.      Conjunctiva/sclera: Conjunctivae normal.   Neck:      Thyroid: No thyroid mass or  thyromegaly.      Trachea: Trachea and phonation normal.   Cardiovascular:      Rate and Rhythm: Normal rate and regular rhythm.      Heart sounds: Normal heart sounds.   Pulmonary:      Effort: Pulmonary effort is normal.      Breath sounds: Normal breath sounds.   Abdominal:      General: Bowel sounds are normal. There is no distension.      Palpations: Abdomen is soft. Abdomen is not rigid.      Tenderness: There is abdominal tenderness in the left lower quadrant. There is no guarding.   Musculoskeletal:         General: Normal range of motion.      Cervical back: Normal range of motion.      Left knee: Bony tenderness present. No swelling or erythema. Normal range of motion.      Right lower leg: No edema.      Left lower leg: No edema.        Legs:    Lymphadenopathy:      Cervical: No cervical adenopathy.      Right cervical: No superficial cervical adenopathy.     Left cervical: No superficial cervical adenopathy.   Skin:     General: Skin is warm.      Findings: No erythema or rash.      Nails: There is no clubbing.   Neurological:      Mental Status: She is alert and oriented to person, place, and time.      Coordination: Coordination normal.      Gait: Gait normal.      Deep Tendon Reflexes: Reflexes are normal and symmetric.      Comments: CN grossly intact   Psychiatric:         Attention and Perception: Attention and perception normal. She is attentive.         Mood and Affect: Mood and affect normal.         Speech: Speech normal.         Behavior: Behavior normal. Behavior is cooperative.         Thought Content: Thought content normal.         Cognition and Memory: Cognition and memory normal.         Judgment: Judgment normal.         A/P    Diagnoses and all orders for this visit:    1. Physical exam, annual (Primary)  PE completed  Preventative labs reviewed  Gynecologist-established  Dentist-encouraged to go regularly  Ophthalmologist-encouraged to go regularly  Dermatologist-denies any lesions  or moles of concern  Vaccinations discussed    2. Acute pain of left knee  -     Uric acid; Future  -     C-reactive Protein; Future  -     XR Knee 1 or 2 View Left; Future  Started 2 months ago and is improving overall.  No known trauma/injury.  With focal point of tenderness, will check x-ray.  Recommend tylenol as needed for pain.  Check labs.    3. Menorrhagia with regular cycle  Stopped birth control which was helping manage her menorrhagia.  Recommend following up with gynecology if she experiences menorrhagia again.    4. Von Willebrand disease  Followed by hematology.    5. Iron deficiency anemia due to chronic blood loss  Reviewed recent labs.  Recommend daily iron supplement for 3-6 months.  Repeat blood work in 3 months.  Followed by hematology.    6. LLQ pain  -     POC Urinalysis Dipstick, Automated  -     NuSwab VG+ - Swab, Vagina; Future  -     NuSwab VG+ - Swab, Vagina  Mild tenderness on exam.  She states that ultrasound was unremarkable with gynecology.  Check urinalysis with mild suprapubic pressure.  Check vaginal swab.  Start stool softner daily to see if pain improves.    7. Constipation, unspecified constipation type  -     docusate sodium (COLACE) 250 MG capsule; Take 1 capsule by mouth Daily.  Dispense: 30 capsule; Refill: 2    8. Restless sleeper  9. Daytime somnolence  10. Insomnia, unspecified type  -     Ambulatory Referral to Sleep Medicine  Mallampati IV.  Referral to sleep medicine.    11. Screening for cholesterol level  -     Lipid Panel; Future    12. Screening for diabetes mellitus  -     Comprehensive metabolic panel; Future    13. Immunization due  -     FluLaval/Fluzone >6 mos ([id number])         Plan of care reviewed with patient at the conclusion of today's visit. Education was provided regarding diet , nutrition , exercise, weight management, supplements, preventative screenings and vaccinations diagnosis, management and any prescribed or recommended OTC medications.   Patient verbalizes understanding of and agreement with management plan.    Dictated Utilizing Dragon Dictation     Please note that portions of this note were completed with a voice recognition program.     Part of this note may be an electronic transcription/translation of spoken language to printed text using the Dragon Dictation System.    Return in about 4 weeks (around 1/11/2023) for Recheck, left knee.     Marjan Sánchez PA-C

## 2022-12-17 LAB
A VAGINAE DNA VAG QL NAA+PROBE: NORMAL SCORE
BVAB2 DNA VAG QL NAA+PROBE: NORMAL SCORE
C ALBICANS DNA VAG QL NAA+PROBE: NEGATIVE
C GLABRATA DNA VAG QL NAA+PROBE: NEGATIVE
C TRACH DNA VAG QL NAA+PROBE: NEGATIVE
MEGA1 DNA VAG QL NAA+PROBE: NORMAL SCORE
N GONORRHOEA DNA VAG QL NAA+PROBE: NEGATIVE
T VAGINALIS DNA VAG QL NAA+PROBE: NEGATIVE

## 2023-01-09 ENCOUNTER — OFFICE VISIT (OUTPATIENT)
Dept: FAMILY MEDICINE CLINIC | Facility: CLINIC | Age: 33
End: 2023-01-09
Payer: COMMERCIAL

## 2023-01-09 VITALS
TEMPERATURE: 97.3 F | WEIGHT: 227 LBS | DIASTOLIC BLOOD PRESSURE: 82 MMHG | BODY MASS INDEX: 36.48 KG/M2 | OXYGEN SATURATION: 98 % | SYSTOLIC BLOOD PRESSURE: 130 MMHG | HEIGHT: 66 IN | HEART RATE: 91 BPM

## 2023-01-09 DIAGNOSIS — D50.0 IRON DEFICIENCY ANEMIA DUE TO CHRONIC BLOOD LOSS: ICD-10-CM

## 2023-01-09 DIAGNOSIS — R31.9 HEMATURIA, UNSPECIFIED TYPE: Primary | ICD-10-CM

## 2023-01-09 DIAGNOSIS — M25.562 ACUTE PAIN OF LEFT KNEE: ICD-10-CM

## 2023-01-09 LAB
BILIRUB BLD-MCNC: NEGATIVE MG/DL
CLARITY, POC: CLEAR
COLOR UR: YELLOW
EXPIRATION DATE: ABNORMAL
GLUCOSE UR STRIP-MCNC: NEGATIVE MG/DL
KETONES UR QL: NEGATIVE
LEUKOCYTE EST, POC: NEGATIVE
Lab: ABNORMAL
NITRITE UR-MCNC: NEGATIVE MG/ML
PH UR: 5 [PH] (ref 5–8)
PROT UR STRIP-MCNC: ABNORMAL MG/DL
RBC # UR STRIP: ABNORMAL /UL
SP GR UR: 1.03 (ref 1–1.03)
UROBILINOGEN UR QL: NORMAL

## 2023-01-09 PROCEDURE — 99214 OFFICE O/P EST MOD 30 MIN: CPT | Performed by: PHYSICIAN ASSISTANT

## 2023-01-09 PROCEDURE — 81003 URINALYSIS AUTO W/O SCOPE: CPT | Performed by: PHYSICIAN ASSISTANT

## 2023-01-09 PROCEDURE — 87086 URINE CULTURE/COLONY COUNT: CPT | Performed by: PHYSICIAN ASSISTANT

## 2023-01-09 RX ORDER — FERROUS SULFATE 325(65) MG
325 TABLET ORAL
Qty: 90 TABLET | Refills: 3 | Status: SHIPPED | OUTPATIENT
Start: 2023-01-09

## 2023-01-09 NOTE — PROGRESS NOTES
Chief Complaint   Patient presents with   • Acute pain of left knee     4 week f/u        Ingrid Boykin is a pleasant 32 y.o. female who is here for ongoing left knee pain, menorrhagia with spotting, blood in urine and iron deficiency.  Patient is no longer on birth control.  She is taking iron daily.  Established with hematology, next appointment in November.  Established with gynecology.  Reports LLQ discomfort and subsequent spotting when she urinates.  Ultrasound transvaginal was unremarkable with gynecology previously.  Her left knee continues to hurt.  Pain is only present when she has pressure below the patella.    Past Medical History:   Diagnosis Date   • Anemia    • Asthma    • Clotting disorder (HCC)    • GERD (gastroesophageal reflux disease)        Past Surgical History:   Procedure Laterality Date   • WISDOM TOOTH EXTRACTION  2012       Family History   Problem Relation Age of Onset   • Diabetes Mother    • Hypertension Mother    • Hyperlipidemia Mother    • Hypertension Father    • Diabetes Maternal Grandmother    • Heart attack Maternal Grandfather    • Ovarian cancer Neg Hx    • Breast cancer Neg Hx    • Colon cancer Neg Hx        Social History     Socioeconomic History   • Marital status:    Tobacco Use   • Smoking status: Never   • Smokeless tobacco: Never   Vaping Use   • Vaping Use: Never used   Substance and Sexual Activity   • Alcohol use: Yes     Alcohol/week: 3.0 standard drinks     Types: 3 Glasses of wine per week   • Drug use: Never   • Sexual activity: Yes     Partners: Male     Birth control/protection: None       No Known Allergies    ROS  Review of Systems   Constitutional: Positive for fatigue. Negative for chills and fever.   Respiratory: Negative for cough and shortness of breath.    Cardiovascular: Negative for chest pain.   Genitourinary: Positive for hematuria and menstrual problem.   Musculoskeletal: Positive for arthralgias and bursitis. Negative for joint swelling.        Vitals:    01/09/23 1504   BP: 130/82   Pulse: 91   Temp: 97.3 °F (36.3 °C)   SpO2: 98%   Weight: 103 kg (227 lb)   Height: 167.6 cm (65.98\")   PainSc: 0-No pain   PainLoc: Knee     Body mass index is 36.66 kg/m².    Current Outpatient Medications on File Prior to Visit   Medication Sig Dispense Refill   • desmopressin (DDAVP NASAL) 0.01 % solution 1 spray into the nostril(s) as directed by provider Daily As Needed (bleeding). 5 mL 12   • docusate sodium (COLACE) 250 MG capsule Take 1 capsule by mouth Daily. 30 capsule 2   • metFORMIN ER (GLUCOPHAGE-XR) 500 MG 24 hr tablet TAKE 1 TABLET BY MOUTH EVERY DAY WITH LAST MEAL 90     • [DISCONTINUED] ferrous sulfate 325 (65 FE) MG tablet Take 1 tablet by mouth 2 (Two) Times a Day With Meals. 60 tablet 2     No current facility-administered medications on file prior to visit.       Results for orders placed or performed in visit on 01/09/23   POC Urinalysis Dipstick, Automated    Specimen: Urine   Result Value Ref Range    Color Yellow Yellow, Straw, Dark Yellow, Felisa    Clarity, UA Clear Clear    Specific Gravity  1.030 1.005 - 1.030    pH, Urine 5.0 5.0 - 8.0    Leukocytes Negative Negative    Nitrite, UA Negative Negative    Protein, POC 1+ (A) Negative mg/dL    Glucose, UA Negative Negative mg/dL    Ketones, UA Negative Negative    Urobilinogen, UA Normal Normal, 0.2 E.U./dL    Bilirubin Negative Negative    Blood, UA 1+ (A) Negative    Lot Number [id number]     Expiration Date 03-        PE    Physical Exam  Vitals reviewed.   Constitutional:       General: She is not in acute distress.     Appearance: Normal appearance. She is well-developed. She is obese. She is not ill-appearing or diaphoretic.   HENT:      Head: Normocephalic and atraumatic.   Eyes:      Extraocular Movements: Extraocular movements intact.      Conjunctiva/sclera: Conjunctivae normal.   Pulmonary:      Effort: No respiratory distress.   Musculoskeletal:         General: Normal  range of motion.      Cervical back: Normal range of motion.   Neurological:      General: No focal deficit present.      Mental Status: She is alert.   Psychiatric:         Attention and Perception: She is attentive.         Mood and Affect: Mood normal.         Speech: Speech normal.         Behavior: Behavior normal. Behavior is cooperative.         Thought Content: Thought content normal.         Judgment: Judgment normal.         A/P    Diagnoses and all orders for this visit:    1. Hematuria, unspecified type (Primary)  -     POC Urinalysis Dipstick, Automated  -     Urine Culture - Urine, Urine, Random Void; Future  -     Urine Culture - Urine, Urine, Random Void  Suspect this is related to menstrual spotting.  She reports spotting today.  Will check urine culture to ensure there is no infection.  Patient to follow-up with gynecology.    2. Iron deficiency anemia due to chronic blood loss  -     ferrous sulfate 325 (65 FE) MG tablet; Take 1 tablet by mouth Daily With Breakfast.  Dispense: 90 tablet; Refill: 3  Continue on iron tablets daily.  Reviewed iron profile from a few months ago showing iron of 29.  Will plan on repeating in June at next follow-up.    3. Acute pain of left knee  Recommend rest, ice and avoiding applying pressure.  Possible bursitis.  If pain persists, would recommend referral to PT and subsequently ortho if pain persists.       Plan of care reviewed with patient at the conclusion of today's visit. Education was provided regarding diagnosis, management and any prescribed or recommended OTC medications.  Patient verbalizes understanding of and agreement with management plan.    Dictated Utilizing Dragon Dictation     Please note that portions of this note were completed with a voice recognition program.     Part of this note may be an electronic transcription/translation of spoken language to printed text using the Dragon Dictation System.    No follow-ups on file.     Marjan GAYLE  ALEISHA Sánchez

## 2023-01-10 LAB — BACTERIA SPEC AEROBE CULT: NO GROWTH

## 2023-05-30 ENCOUNTER — OFFICE VISIT (OUTPATIENT)
Dept: SLEEP MEDICINE | Facility: HOSPITAL | Age: 33
End: 2023-05-30

## 2023-05-30 VITALS
SYSTOLIC BLOOD PRESSURE: 141 MMHG | HEIGHT: 66 IN | BODY MASS INDEX: 37.25 KG/M2 | HEART RATE: 81 BPM | WEIGHT: 231.8 LBS | OXYGEN SATURATION: 98 % | DIASTOLIC BLOOD PRESSURE: 99 MMHG

## 2023-05-30 DIAGNOSIS — E66.9 OBESITY (BMI 30-39.9): ICD-10-CM

## 2023-05-30 DIAGNOSIS — G47.19 EXCESSIVE DAYTIME SLEEPINESS: ICD-10-CM

## 2023-05-30 DIAGNOSIS — F51.04 PSYCHOPHYSIOLOGICAL INSOMNIA: ICD-10-CM

## 2023-05-30 DIAGNOSIS — G47.33 OBSTRUCTIVE SLEEP APNEA, ADULT: Primary | ICD-10-CM

## 2023-05-30 PROBLEM — E28.2 PCOS (POLYCYSTIC OVARIAN SYNDROME): Status: ACTIVE | Noted: 2023-05-30

## 2023-05-30 RX ORDER — NORGESTIMATE AND ETHINYL ESTRADIOL 0.25-0.035
1 KIT ORAL DAILY
COMMUNITY
Start: 2023-02-17

## 2023-05-30 NOTE — PROGRESS NOTES
Ingrid Boykin is a 33 y.o. female.   Chief Complaint   Patient presents with   • Sleeping Problem       HPI     33 y.o. female seen in consultation at the request of  for evaluation of the above.     She describes a several year history and possibly longer of problems with sleep initiation.  She says that this problem has occurred for longer than the more recent occurrence, which has become sleep maintenance as well.  It can take her up to 2 hours to fall asleep at night.  Sometimes she falls asleep much sooner.    She typically will start awakening around 3 AM and often can go back to sleep for a prolonged period of time.  Even if she can she has frequent awakenings particularly even later in the morning.    She does not discern anything that is awakening her such as pain, no voice, breathing, snoring, etc.  Her bed partner notes no significant snoring or breathing difficulties.  He notes no unusual activities or movements.    She is mildly sleepy during the day.  She describes what she is experiencing is more fatigue than sleepiness.    She denies any nocturnal hallucinations or sleep paralysis.  She has no cataplexy.  She has no significant RLS type symptoms.  She does have some intermittent abdominal pain from her PCOS and this can affect her sleep at night but is not the primary issue in her opinion    [place] Scale is: 4/24    The patient's relevant past medical, surgical, family, and social history reviewed and updated in Epic as appropriate.    Current medications are:   Current Outpatient Medications:   •  ferrous sulfate 325 (65 FE) MG tablet, Take 1 tablet by mouth Daily With Breakfast., Disp: 90 tablet, Rfl: 3  •  metFORMIN ER (GLUCOPHAGE-XR) 500 MG 24 hr tablet, TAKE 1 TABLET BY MOUTH EVERY DAY WITH LAST MEAL 90, Disp: , Rfl:   •  norgestimate-ethinyl estradiol (ORTHO-CYCLEN) 0.25-35 MG-MCG per tablet, Take 1 tablet by mouth Daily., Disp: , Rfl: .    Review of Systems    Review of Systems  ROS  "documented in patient questionnaire ×14 systems.  Reviewed with patient.  Otherwise negative except as noted in HPI.    Physical Exam    Blood pressure 141/99, pulse 81, height 167.6 cm (66\"), weight 105 kg (231 lb 12.8 oz), SpO2 98 %, not currently breastfeeding. Body mass index is 37.41 kg/m².    Physical Exam  Vitals and nursing note reviewed.   Constitutional:       Appearance: Normal appearance. She is well-developed.   HENT:      Head: Normocephalic and atraumatic.      Nose: Nose normal.      Mouth/Throat:      Mouth: Mucous membranes are moist.      Pharynx: Oropharynx is clear. No oropharyngeal exudate.      Comments: Class III airway  Eyes:      General: No scleral icterus.     Conjunctiva/sclera: Conjunctivae normal.   Neck:      Thyroid: No thyromegaly.      Trachea: No tracheal deviation.   Cardiovascular:      Rate and Rhythm: Normal rate and regular rhythm.      Heart sounds: No murmur heard.    No friction rub. No gallop.   Pulmonary:      Effort: Pulmonary effort is normal. No respiratory distress.      Breath sounds: No wheezing or rales.   Musculoskeletal:         General: No deformity. Normal range of motion.   Skin:     General: Skin is warm and dry.      Findings: No rash.   Neurological:      Mental Status: She is alert and oriented to person, place, and time.   Psychiatric:         Behavior: Behavior normal.         Thought Content: Thought content normal.         DATA:    Reviewed 12/14/2022 note from Marjan Sánchez PAC    Reviewed metabolic profile dated 12/14/2022    ASSESSMENT:    Problem List Items Addressed This Visit     Obesity (BMI 30-39.9)   Other Visit Diagnoses     Obstructive sleep apnea, adult    -  Primary    Relevant Orders    Home Sleep Study    Excessive daytime sleepiness        Relevant Orders    Home Sleep Study    Psychophysiological insomnia              33-year-old female with a persistent history of difficulty with sleep initiation and maintenance.  Her history " is nonspecific for an apparent cause.  The bed partner questionnaire is negative for any breathing or motor activities that are unusual.  She notes no specific symptoms at night.    I think we should proceed in a multifaceted fashion.  I will give her information on sleep hygiene and I discussed this with her.  I also discussed stimulus control therapy and gave her information on this.  She keeps a regular sleep schedule.  The only sleep aid she has tried have been over-the-counter including melatonin without much success.  I think she does warrant a home sleep apnea test.  Despite her lack of snoring or witnessed apneas she is at risk for sleep disordered breathing based upon her class III airway and elevated BMI and this should be ruled out    PLAN:    1. Home sleep apnea testing is an appropriate initial diagnostic step in her case.  2. I discussed the diagnostic process as well as treatment options for obstructive sleep apnea if that is diagnosed.  3. I gave her information on sleep hygiene and stimulus control therapy as well as links to the [facility] sleep wellness site  4. Further recommendations after her HSAT.  If that is without significant concerns I think we concentrate on treatment of insomnia nonpharmacologically and then if that is unsuccessful she could proceed with a PSG potentially  5. Close sleep center follow-up.      I have reviewed the results of my evaluation and impression and discussed my recommendations in detail with the patient.    Signed by  Jimbo Blanc MD    May 30, 2023      CC: Marjan Sánchez PA-C          No ref. provider found

## 2023-12-22 ENCOUNTER — APPOINTMENT (OUTPATIENT)
Dept: CT IMAGING | Facility: HOSPITAL | Age: 33
End: 2023-12-22
Payer: COMMERCIAL

## 2023-12-22 LAB
ALBUMIN SERPL-MCNC: 3.9 G/DL (ref 3.5–5.2)
ALBUMIN/GLOB SERPL: 0.9 G/DL
ALP SERPL-CCNC: 99 U/L (ref 39–117)
ALT SERPL W P-5'-P-CCNC: 16 U/L (ref 1–33)
ANION GAP SERPL CALCULATED.3IONS-SCNC: 13 MMOL/L (ref 5–15)
AST SERPL-CCNC: 16 U/L (ref 1–32)
B-HCG UR QL: NEGATIVE
BASOPHILS # BLD AUTO: 0.03 10*3/MM3 (ref 0–0.2)
BASOPHILS NFR BLD AUTO: 0.3 % (ref 0–1.5)
BILIRUB SERPL-MCNC: 0.4 MG/DL (ref 0–1.2)
BILIRUB UR QL STRIP: NEGATIVE
BUN SERPL-MCNC: 7 MG/DL (ref 6–20)
BUN/CREAT SERPL: 6.7 (ref 7–25)
CALCIUM SPEC-SCNC: 8.9 MG/DL (ref 8.6–10.5)
CHLORIDE SERPL-SCNC: 105 MMOL/L (ref 98–107)
CLARITY UR: CLEAR
CO2 SERPL-SCNC: 23 MMOL/L (ref 22–29)
COLOR UR: YELLOW
CREAT SERPL-MCNC: 1.04 MG/DL (ref 0.57–1)
DEPRECATED RDW RBC AUTO: 43.3 FL (ref 37–54)
EGFRCR SERPLBLD CKD-EPI 2021: 73.8 ML/MIN/1.73
EOSINOPHIL # BLD AUTO: 0.39 10*3/MM3 (ref 0–0.4)
EOSINOPHIL NFR BLD AUTO: 3.3 % (ref 0.3–6.2)
ERYTHROCYTE [DISTWIDTH] IN BLOOD BY AUTOMATED COUNT: 15.5 % (ref 12.3–15.4)
EXPIRATION DATE: NORMAL
GLOBULIN UR ELPH-MCNC: 4.3 GM/DL
GLUCOSE SERPL-MCNC: 105 MG/DL (ref 65–99)
GLUCOSE UR STRIP-MCNC: NEGATIVE MG/DL
HCT VFR BLD AUTO: 37.6 % (ref 34–46.6)
HGB BLD-MCNC: 11.5 G/DL (ref 12–15.9)
HGB UR QL STRIP.AUTO: NEGATIVE
IMM GRANULOCYTES # BLD AUTO: 0.04 10*3/MM3 (ref 0–0.05)
IMM GRANULOCYTES NFR BLD AUTO: 0.3 % (ref 0–0.5)
INTERNAL NEGATIVE CONTROL: NEGATIVE
INTERNAL POSITIVE CONTROL: POSITIVE
KETONES UR QL STRIP: ABNORMAL
LEUKOCYTE ESTERASE UR QL STRIP.AUTO: NEGATIVE
LIPASE SERPL-CCNC: 33 U/L (ref 13–60)
LYMPHOCYTES # BLD AUTO: 2.72 10*3/MM3 (ref 0.7–3.1)
LYMPHOCYTES NFR BLD AUTO: 22.8 % (ref 19.6–45.3)
Lab: NORMAL
MCH RBC QN AUTO: 23.7 PG (ref 26.6–33)
MCHC RBC AUTO-ENTMCNC: 30.6 G/DL (ref 31.5–35.7)
MCV RBC AUTO: 77.5 FL (ref 79–97)
MONOCYTES # BLD AUTO: 0.87 10*3/MM3 (ref 0.1–0.9)
MONOCYTES NFR BLD AUTO: 7.3 % (ref 5–12)
NEUTROPHILS NFR BLD AUTO: 66 % (ref 42.7–76)
NEUTROPHILS NFR BLD AUTO: 7.9 10*3/MM3 (ref 1.7–7)
NITRITE UR QL STRIP: NEGATIVE
NRBC BLD AUTO-RTO: 0 /100 WBC (ref 0–0.2)
PH UR STRIP.AUTO: 7 [PH] (ref 5–8)
PLATELET # BLD AUTO: 349 10*3/MM3 (ref 140–450)
PMV BLD AUTO: 11.3 FL (ref 6–12)
POTASSIUM SERPL-SCNC: 4.1 MMOL/L (ref 3.5–5.2)
PROT SERPL-MCNC: 8.2 G/DL (ref 6–8.5)
PROT UR QL STRIP: NEGATIVE
RBC # BLD AUTO: 4.85 10*6/MM3 (ref 3.77–5.28)
SODIUM SERPL-SCNC: 141 MMOL/L (ref 136–145)
SP GR UR STRIP: 1.02 (ref 1–1.03)
UROBILINOGEN UR QL STRIP: ABNORMAL
WBC NRBC COR # BLD AUTO: 11.95 10*3/MM3 (ref 3.4–10.8)

## 2023-12-22 PROCEDURE — 74176 CT ABD & PELVIS W/O CONTRAST: CPT

## 2023-12-22 PROCEDURE — 81003 URINALYSIS AUTO W/O SCOPE: CPT | Performed by: EMERGENCY MEDICINE

## 2023-12-22 PROCEDURE — 99284 EMERGENCY DEPT VISIT MOD MDM: CPT

## 2023-12-22 PROCEDURE — 81025 URINE PREGNANCY TEST: CPT | Performed by: EMERGENCY MEDICINE

## 2023-12-22 PROCEDURE — 85025 COMPLETE CBC W/AUTO DIFF WBC: CPT | Performed by: EMERGENCY MEDICINE

## 2023-12-22 PROCEDURE — 80053 COMPREHEN METABOLIC PANEL: CPT | Performed by: EMERGENCY MEDICINE

## 2023-12-22 PROCEDURE — 83690 ASSAY OF LIPASE: CPT | Performed by: EMERGENCY MEDICINE

## 2023-12-22 RX ORDER — ONDANSETRON 2 MG/ML
4 INJECTION INTRAMUSCULAR; INTRAVENOUS ONCE
Status: DISCONTINUED | OUTPATIENT
Start: 2023-12-23 | End: 2023-12-23

## 2023-12-22 RX ORDER — FAMOTIDINE 10 MG/ML
20 INJECTION, SOLUTION INTRAVENOUS ONCE
Status: DISCONTINUED | OUTPATIENT
Start: 2023-12-23 | End: 2023-12-23

## 2023-12-22 RX ORDER — KETOROLAC TROMETHAMINE 30 MG/ML
30 INJECTION, SOLUTION INTRAMUSCULAR; INTRAVENOUS ONCE
Status: DISCONTINUED | OUTPATIENT
Start: 2023-12-23 | End: 2023-12-23

## 2023-12-22 RX ORDER — SODIUM CHLORIDE 9 MG/ML
10 INJECTION INTRAVENOUS AS NEEDED
Status: DISCONTINUED | OUTPATIENT
Start: 2023-12-22 | End: 2023-12-23 | Stop reason: HOSPADM

## 2023-12-23 ENCOUNTER — HOSPITAL ENCOUNTER (EMERGENCY)
Facility: HOSPITAL | Age: 33
Discharge: HOME | End: 2023-12-23
Attending: EMERGENCY MEDICINE
Payer: COMMERCIAL

## 2023-12-23 VITALS
DIASTOLIC BLOOD PRESSURE: 74 MMHG | HEIGHT: 67 IN | BODY MASS INDEX: 33.74 KG/M2 | HEART RATE: 93 BPM | RESPIRATION RATE: 16 BRPM | OXYGEN SATURATION: 100 % | TEMPERATURE: 99 F | SYSTOLIC BLOOD PRESSURE: 130 MMHG | WEIGHT: 215 LBS

## 2023-12-23 DIAGNOSIS — R11.0 NAUSEA: ICD-10-CM

## 2023-12-23 DIAGNOSIS — R10.9 LEFT SIDED ABDOMINAL PAIN: ICD-10-CM

## 2023-12-23 DIAGNOSIS — K57.32 DIVERTICULITIS OF LARGE INTESTINE WITHOUT PERFORATION OR ABSCESS WITHOUT BLEEDING: Primary | ICD-10-CM

## 2023-12-23 LAB
HOLD SPECIMEN: NORMAL
WHOLE BLOOD HOLD COAG: NORMAL
WHOLE BLOOD HOLD SPECIMEN: NORMAL

## 2023-12-23 PROCEDURE — 63710000001 ONDANSETRON ODT 4 MG TABLET DISPERSIBLE: Performed by: PHYSICIAN ASSISTANT

## 2023-12-23 RX ORDER — HYDROCODONE BITARTRATE AND ACETAMINOPHEN 5; 325 MG/1; MG/1
1 TABLET ORAL ONCE
Status: COMPLETED | OUTPATIENT
Start: 2023-12-23 | End: 2023-12-23

## 2023-12-23 RX ORDER — CIPROFLOXACIN 250 MG/1
500 TABLET, FILM COATED ORAL ONCE
Status: COMPLETED | OUTPATIENT
Start: 2023-12-23 | End: 2023-12-23

## 2023-12-23 RX ORDER — METRONIDAZOLE 500 MG/1
500 TABLET ORAL 2 TIMES DAILY
Qty: 14 TABLET | Refills: 0 | Status: SHIPPED | OUTPATIENT
Start: 2023-12-23

## 2023-12-23 RX ORDER — HYDROCODONE BITARTRATE AND ACETAMINOPHEN 5; 325 MG/1; MG/1
1 TABLET ORAL EVERY 4 HOURS PRN
Qty: 12 TABLET | Refills: 0 | Status: SHIPPED | OUTPATIENT
Start: 2023-12-23

## 2023-12-23 RX ORDER — CIPROFLOXACIN 500 MG/1
500 TABLET, FILM COATED ORAL EVERY 12 HOURS
Qty: 14 TABLET | Refills: 0 | Status: SHIPPED | OUTPATIENT
Start: 2023-12-23

## 2023-12-23 RX ORDER — CIPROFLOXACIN 500 MG/1
500 TABLET, FILM COATED ORAL EVERY 12 HOURS
Qty: 14 TABLET | Refills: 0 | Status: SHIPPED | OUTPATIENT
Start: 2023-12-23 | End: 2023-12-23 | Stop reason: SDUPTHER

## 2023-12-23 RX ORDER — FAMOTIDINE 20 MG/1
20 TABLET, FILM COATED ORAL ONCE
Status: COMPLETED | OUTPATIENT
Start: 2023-12-23 | End: 2023-12-23

## 2023-12-23 RX ORDER — AMOXICILLIN AND CLAVULANATE POTASSIUM 875; 125 MG/1; MG/1
1 TABLET, FILM COATED ORAL ONCE
Status: DISCONTINUED | OUTPATIENT
Start: 2023-12-23 | End: 2023-12-23

## 2023-12-23 RX ORDER — ONDANSETRON 4 MG/1
4 TABLET, ORALLY DISINTEGRATING ORAL ONCE
Status: COMPLETED | OUTPATIENT
Start: 2023-12-23 | End: 2023-12-23

## 2023-12-23 RX ORDER — METRONIDAZOLE 500 MG/1
500 TABLET ORAL ONCE
Status: COMPLETED | OUTPATIENT
Start: 2023-12-23 | End: 2023-12-23

## 2023-12-23 RX ORDER — ONDANSETRON 4 MG/1
4 TABLET, ORALLY DISINTEGRATING ORAL EVERY 4 HOURS
Qty: 12 TABLET | Refills: 0 | Status: SHIPPED | OUTPATIENT
Start: 2023-12-23 | End: 2023-12-28 | Stop reason: SDUPTHER

## 2023-12-23 RX ADMIN — CIPROFLOXACIN HYDROCHLORIDE 500 MG: 250 TABLET, FILM COATED ORAL at 02:14

## 2023-12-23 RX ADMIN — FAMOTIDINE 20 MG: 20 TABLET, FILM COATED ORAL at 02:14

## 2023-12-23 RX ADMIN — ONDANSETRON 4 MG: 4 TABLET, ORALLY DISINTEGRATING ORAL at 02:14

## 2023-12-23 RX ADMIN — HYDROCODONE BITARTRATE AND ACETAMINOPHEN 1 TABLET: 5; 325 TABLET ORAL at 02:14

## 2023-12-23 RX ADMIN — METRONIDAZOLE 500 MG: 500 TABLET ORAL at 02:14

## 2023-12-23 NOTE — DISCHARGE INSTRUCTIONS
CBC and chemistries were within normal limits.  Urinalysis was also within normal limits.  CT imaging revealed mild uncomplicated diverticulitis to the descending colon.  Rx for Cipro and Flagyl twice daily x 1 week as antibiotics.  Rx for Norco and Zofran as needed for pain and nausea.  Recommend GI follow-up with Dr. Brunner for close recheck.  Return to the ER if worsening symptoms.

## 2023-12-23 NOTE — ED PROVIDER NOTES
Subjective   History of Present Illness  This is a 33-year-old female that presents the ER with 1 week history of left upper quadrant/left lower quadrant, and left flank pain described as cramping in nature.  Patient denies any nausea/vomiting.  She has chronic loose stools, usually 2-3 stools daily with taking metformin.  She denies dysuria, urgency, or frequency, or hematuria.  She denies previous abdominal surgeries and has never had EGD or colonoscopy.  Last menstrual period finished 1 week ago and was at the normal time.  She denies any rhinorrhea, nasal congestion, cough, or sore throat.  Pain is worsened with movement, palpation, and eating sometimes, but no particular types of foods.  Patient denies previous history of similar symptoms.  Past medical history is positive for GERD, asthma, anemia, and clotting disorder.  Patient does not report alcohol use and does not report frequent NSAID use.  She did try some naproxen for her left-sided abdominal pain and it improved transiently.  No other concerns at this time.    History provided by:  Patient  Abdominal Pain  Pain location:  L flank, LLQ and LUQ  Pain quality: cramping    Pain radiates to:  Does not radiate  Duration:  1 week  Timing:  Intermittent  Progression:  Waxing and waning  Chronicity:  New  Context: not alcohol use and not previous surgeries    Context comment:  1 week history of left-sided abdominal pain and left flank pain.  No n/v. Chronic loose stools secondary to Metf ormin. No urinary changes.  No fever/chills.  Relieved by:  Nothing  Worsened by:  Movement, palpation and eating  Ineffective treatments:  NSAIDs  Associated symptoms: belching and diarrhea (2-3 stools daily with taking metformin.)    Associated symptoms: no anorexia, no chest pain, no chills, no cough, no dysuria, no fatigue, no fever, no hematochezia, no hematuria, no melena, no nausea, no shortness of breath, no sore throat, no vaginal bleeding, no vaginal discharge and no  vomiting    Risk factors: no alcohol abuse and has not had multiple surgeries        Review of Systems   Constitutional: Negative.  Negative for activity change, appetite change, chills, diaphoresis, fatigue and fever.   HENT: Negative.  Negative for congestion, ear pain, postnasal drip, rhinorrhea, sinus pressure, sinus pain, sneezing and sore throat.    Respiratory: Negative.  Negative for cough and shortness of breath.    Cardiovascular: Negative.  Negative for chest pain, palpitations and leg swelling.   Gastrointestinal:  Positive for abdominal pain (Left upper quadrant and left lower quadrant) and diarrhea (2-3 stools daily with taking metformin.). Negative for abdominal distention, anorexia, hematochezia, melena, nausea and vomiting.        No previous EGD or colonoscopy.   Genitourinary:  Positive for flank pain (Left flank pain). Negative for dysuria, frequency, hematuria, urgency, vaginal bleeding and vaginal discharge.        LMP: 1 week ago.   Musculoskeletal:  Negative for back pain.   Neurological: Negative.  Negative for dizziness, syncope and headaches.   All other systems reviewed and are negative.      Past Medical History:   Diagnosis Date    Anemia     Asthma     Clotting disorder     GERD (gastroesophageal reflux disease)        No Known Allergies    Past Surgical History:   Procedure Laterality Date    WISDOM TOOTH EXTRACTION  2012       Family History   Problem Relation Age of Onset    Diabetes Mother     Hypertension Mother     Hyperlipidemia Mother     Hypertension Father     Diabetes Maternal Grandmother     Heart attack Maternal Grandfather     Ovarian cancer Neg Hx     Breast cancer Neg Hx     Colon cancer Neg Hx        Social History     Socioeconomic History    Marital status:    Tobacco Use    Smoking status: Never    Smokeless tobacco: Never   Vaping Use    Vaping Use: Never used   Substance and Sexual Activity    Alcohol use: Yes     Alcohol/week: 3.0 standard drinks of  alcohol     Types: 3 Glasses of wine per week    Drug use: Never    Sexual activity: Yes     Partners: Male     Birth control/protection: None           Objective   Physical Exam  Vitals and nursing note reviewed.   Constitutional:       General: She is not in acute distress.     Appearance: Normal appearance. She is not ill-appearing, toxic-appearing or diaphoretic.      Comments: No acute sign of pain or distress.  Nontoxic.   HENT:      Head: Normocephalic and atraumatic.      Right Ear: Tympanic membrane normal.      Left Ear: Tympanic membrane normal.      Nose: Nose normal.      Mouth/Throat:      Mouth: Mucous membranes are moist.      Pharynx: Oropharynx is clear.      Comments: Oral mucous membranes are moist  Eyes:      Extraocular Movements: Extraocular movements intact.      Conjunctiva/sclera: Conjunctivae normal.      Pupils: Pupils are equal, round, and reactive to light.   Cardiovascular:      Rate and Rhythm: Regular rhythm. Tachycardia present. No extrasystoles are present.     Pulses: Normal pulses.      Heart sounds: Normal heart sounds.      Comments: Mild tachycardia.  No ectopy  Pulmonary:      Effort: Pulmonary effort is normal.      Breath sounds: Normal breath sounds.      Comments: Lungs are clear to auscultation bilaterally  Abdominal:      General: Bowel sounds are normal. There is no distension.      Palpations: Abdomen is soft.      Tenderness: There is abdominal tenderness in the left upper quadrant and left lower quadrant. There is no right CVA tenderness, left CVA tenderness, guarding or rebound.      Hernia: No hernia is present.      Comments: Abdomen soft without distention.  Active bowel sounds in all 4 quadrants.  Tenderness to the left upper quadrant and left lower quadrant and left flank.  No rebound or guarding.  No hernia appreciated.  No CVA tenderness.  Abdominal exam is benign.   Musculoskeletal:         General: Normal range of motion.      Cervical back: Normal range  of motion and neck supple.      Right lower leg: No edema.      Left lower leg: No edema.   Skin:     General: Skin is warm and dry.   Neurological:      General: No focal deficit present.      Mental Status: She is alert.         Procedures           ED Course  ED Course as of 12/23/23 0511   Sat Dec 23, 2023   0005 Patient is afebrile and all other vital signs are stable.  CBC and chemistries were within normal limits.  Lipase is 33.  Urinalysis reveals 15 mg/dL of ketones but no acute infectious process and no microscopic blood.  Urine pregnancy is negative.  CT imaging of the abdomen/pelvis without contrast reveals acute, uncomplicated diverticulitis involving the mid descending colon.  There was some inflammatory change surrounding the mid descending colon where there are 2 associated inflamed diverticula.  There is no abscess or perforation.  Patient will be given IV fluid bolus and Toradol as well as Norco 5 mg by mouth.  I also will initiate Augmentin 875 mg by mouth twice daily x 7 days and recommend follow-up with GI, Dr. Brunner. [FC]      ED Course User Index  [FC] Teresita Castellon PA-C                                        Recent Results (from the past 24 hour(s))   Urinalysis With Microscopic If Indicated (No Culture) - Urine, Clean Catch    Collection Time: 12/22/23 11:14 PM    Specimen: Urine, Clean Catch   Result Value Ref Range    Color, UA Yellow Yellow, Straw    Appearance, UA Clear Clear    pH, UA 7.0 5.0 - 8.0    Specific Gravity, UA 1.022 1.001 - 1.030    Glucose, UA Negative Negative    Ketones, UA 15 mg/dL (1+) (A) Negative    Bilirubin, UA Negative Negative    Blood, UA Negative Negative    Protein, UA Negative Negative    Leuk Esterase, UA Negative Negative    Nitrite, UA Negative Negative    Urobilinogen, UA 1.0 E.U./dL 0.2 - 1.0 E.U./dL   Comprehensive Metabolic Panel    Collection Time: 12/22/23 11:22 PM    Specimen: Blood   Result Value Ref Range    Glucose 105 (H) 65 - 99 mg/dL    BUN  7 6 - 20 mg/dL    Creatinine 1.04 (H) 0.57 - 1.00 mg/dL    Sodium 141 136 - 145 mmol/L    Potassium 4.1 3.5 - 5.2 mmol/L    Chloride 105 98 - 107 mmol/L    CO2 23.0 22.0 - 29.0 mmol/L    Calcium 8.9 8.6 - 10.5 mg/dL    Total Protein 8.2 6.0 - 8.5 g/dL    Albumin 3.9 3.5 - 5.2 g/dL    ALT (SGPT) 16 1 - 33 U/L    AST (SGOT) 16 1 - 32 U/L    Alkaline Phosphatase 99 39 - 117 U/L    Total Bilirubin 0.4 0.0 - 1.2 mg/dL    Globulin 4.3 gm/dL    A/G Ratio 0.9 g/dL    BUN/Creatinine Ratio 6.7 (L) 7.0 - 25.0    Anion Gap 13.0 5.0 - 15.0 mmol/L    eGFR 73.8 >60.0 mL/min/1.73   Lipase    Collection Time: 12/22/23 11:22 PM    Specimen: Blood   Result Value Ref Range    Lipase 33 13 - 60 U/L   Green Top (Gel)    Collection Time: 12/22/23 11:22 PM   Result Value Ref Range    Extra Tube Hold for add-ons.    Lavender Top    Collection Time: 12/22/23 11:22 PM   Result Value Ref Range    Extra Tube hold for add-on    Gold Top - SST    Collection Time: 12/22/23 11:22 PM   Result Value Ref Range    Extra Tube Hold for add-ons.    Gray Top    Collection Time: 12/22/23 11:22 PM   Result Value Ref Range    Extra Tube Hold for add-ons.    Light Blue Top    Collection Time: 12/22/23 11:22 PM   Result Value Ref Range    Extra Tube Hold for add-ons.    CBC Auto Differential    Collection Time: 12/22/23 11:22 PM    Specimen: Blood   Result Value Ref Range    WBC 11.95 (H) 3.40 - 10.80 10*3/mm3    RBC 4.85 3.77 - 5.28 10*6/mm3    Hemoglobin 11.5 (L) 12.0 - 15.9 g/dL    Hematocrit 37.6 34.0 - 46.6 %    MCV 77.5 (L) 79.0 - 97.0 fL    MCH 23.7 (L) 26.6 - 33.0 pg    MCHC 30.6 (L) 31.5 - 35.7 g/dL    RDW 15.5 (H) 12.3 - 15.4 %    RDW-SD 43.3 37.0 - 54.0 fl    MPV 11.3 6.0 - 12.0 fL    Platelets 349 140 - 450 10*3/mm3    Neutrophil % 66.0 42.7 - 76.0 %    Lymphocyte % 22.8 19.6 - 45.3 %    Monocyte % 7.3 5.0 - 12.0 %    Eosinophil % 3.3 0.3 - 6.2 %    Basophil % 0.3 0.0 - 1.5 %    Immature Grans % 0.3 0.0 - 0.5 %    Neutrophils, Absolute 7.90 (H) 1.70  "- 7.00 10*3/mm3    Lymphocytes, Absolute 2.72 0.70 - 3.10 10*3/mm3    Monocytes, Absolute 0.87 0.10 - 0.90 10*3/mm3    Eosinophils, Absolute 0.39 0.00 - 0.40 10*3/mm3    Basophils, Absolute 0.03 0.00 - 0.20 10*3/mm3    Immature Grans, Absolute 0.04 0.00 - 0.05 10*3/mm3    nRBC 0.0 0.0 - 0.2 /100 WBC   POC Urine Pregnancy    Collection Time: 12/22/23 11:30 PM    Specimen: Urine   Result Value Ref Range    HCG, Urine, QL Negative Negative    Lot Number [id number]     Internal Positive Control Positive Positive, Passed    Internal Negative Control Negative Negative, Passed    Expiration Date 2/4/25      Note: In addition to lab results from this visit, the labs listed above may include labs taken at another facility or during a different encounter within the last 24 hours. Please correlate lab times with ED admission and discharge times for further clarification of the services performed during this visit.    CT Abdomen Pelvis Without Contrast   Final Result   Impression:   Acute, uncomplicated diverticulitis involving the mid descending colon.                  Electronically Signed: Silvano Palma MD     12/22/2023 11:58 PM EST     Workstation ID: [id number]        Vitals:    12/22/23 2122 12/23/23 0229   BP: 136/93 130/74   Pulse: 107 93   Resp: 16    Temp: 99 °F (37.2 °C)    SpO2: 98% 100%   Weight: 97.5 kg (215 lb)    Height: 170.2 cm (67\")      Medications   HYDROcodone-acetaminophen (NORCO) 5-325 MG per tablet 1 tablet (1 tablet Oral Given 12/23/23 0214)   ciprofloxacin (CIPRO) tablet 500 mg (500 mg Oral Given 12/23/23 0214)   metroNIDAZOLE (FLAGYL) tablet 500 mg (500 mg Oral Given 12/23/23 0214)   ondansetron ODT (ZOFRAN-ODT) disintegrating tablet 4 mg (4 mg Oral Given 12/23/23 0214)   famotidine (PEPCID) tablet 20 mg (20 mg Oral Given 12/23/23 0214)     ECG/EMG Results (last 24 hours)       ** No results found for the last 24 hours. **          No orders to display     CAITLIN query complete. Treatment plan to include " limited course of prescribed  controlled substance. Risks including addiction, benefits, and alternatives presented to patient.            Medical Decision Making  Problems Addressed:  Diverticulitis of large intestine without perforation or abscess without bleeding: complicated acute illness or injury  Left sided abdominal pain: complicated acute illness or injury  Nausea: complicated acute illness or injury    Amount and/or Complexity of Data Reviewed  Labs: ordered.  Radiology: ordered.    Risk  Prescription drug management.        Final diagnoses:   Diverticulitis of large intestine without perforation or abscess without bleeding   Left sided abdominal pain   Nausea       ED Disposition  ED Disposition       ED Disposition   Discharge    Condition   Stable    Comment   --               Brunner, Mark I, MD  [address]  [place] [address]  [phone]    Call today  Call today for first available [facility] EMERGENCY DEPARTMENT  [address] [address] [address] [phone]    If symptoms worsen         Medication List        New Prescriptions      ciprofloxacin 500 MG tablet  Commonly known as: CIPRO  Take 1 tablet by mouth Every 12 (Twelve) Hours.     HYDROcodone-acetaminophen 5-325 MG per tablet  Commonly known as: NORCO  Take 1 tablet by mouth Every 4 (Four) Hours As Needed for Moderate Pain.     metroNIDAZOLE 500 MG tablet  Commonly known as: FLAGYL  Take 1 tablet by mouth 2 (Two) Times a Day.     ondansetron ODT 4 MG disintegrating tablet  Commonly known as: ZOFRAN-ODT  Place 1 tablet on the tongue Every 4 (Four) Hours.               Where to Get Your Medications        These medications were sent to [facility]/pharmacy #[id number] - [place], [place] - [address] [place] [address] AT CORNER OF [facility] - [phone]  - [phone] [address] [address]      Hours: 24-hours Phone: [phone]   HYDROcodone-acetaminophen 5-325 MG per  tablet  metroNIDAZOLE 500 MG tablet  ondansetron ODT 4 MG disintegrating tablet       Information about where to get these medications is not yet available    Ask your nurse or doctor about these medications  ciprofloxacin 500 MG tablet            Teresita Castellon, ALEISHA  12/23/23 0527

## 2023-12-28 ENCOUNTER — TELEPHONE (OUTPATIENT)
Dept: FAMILY MEDICINE CLINIC | Facility: CLINIC | Age: 33
End: 2023-12-28
Payer: COMMERCIAL

## 2023-12-28 RX ORDER — ONDANSETRON 4 MG/1
4 TABLET, ORALLY DISINTEGRATING ORAL EVERY 4 HOURS
Qty: 10 TABLET | Refills: 0 | Status: SHIPPED | OUTPATIENT
Start: 2023-12-28

## 2024-07-12 ENCOUNTER — TRANSCRIBE ORDERS (OUTPATIENT)
Dept: ADMINISTRATIVE | Facility: HOSPITAL | Age: 34
End: 2024-07-12

## 2024-07-12 DIAGNOSIS — M79.605 LEFT LEG PAIN: Primary | ICD-10-CM

## 2024-07-30 ENCOUNTER — HOSPITAL ENCOUNTER (OUTPATIENT)
Facility: HOSPITAL | Age: 34
Discharge: HOME | End: 2024-07-30
Admitting: PHYSICIAN ASSISTANT
Payer: COMMERCIAL

## 2024-07-30 VITALS — WEIGHT: 215 LBS | HEIGHT: 67 IN | BODY MASS INDEX: 33.74 KG/M2

## 2024-07-30 DIAGNOSIS — M79.605 LEFT LEG PAIN: ICD-10-CM

## 2024-07-30 LAB
BH CV LOWER VASCULAR LEFT COMMON FEMORAL AUGMENT: NORMAL
BH CV LOWER VASCULAR LEFT COMMON FEMORAL COMPRESS: NORMAL
BH CV LOWER VASCULAR LEFT COMMON FEMORAL PHASIC: NORMAL
BH CV LOWER VASCULAR LEFT COMMON FEMORAL SPONT: NORMAL
BH CV LOWER VASCULAR LEFT DISTAL FEMORAL AUGMENT: NORMAL
BH CV LOWER VASCULAR LEFT DISTAL FEMORAL COMPRESS: NORMAL
BH CV LOWER VASCULAR LEFT DISTAL FEMORAL PHASIC: NORMAL
BH CV LOWER VASCULAR LEFT DISTAL FEMORAL SPONT: NORMAL
BH CV LOWER VASCULAR LEFT GASTRONEMIUS COMPRESS: NORMAL
BH CV LOWER VASCULAR LEFT GREATER SAPH AK COMPRESS: NORMAL
BH CV LOWER VASCULAR LEFT GREATER SAPH BK COMPRESS: NORMAL
BH CV LOWER VASCULAR LEFT LESSER SAPH COMPRESS: NORMAL
BH CV LOWER VASCULAR LEFT MID FEMORAL AUGMENT: NORMAL
BH CV LOWER VASCULAR LEFT MID FEMORAL COMPRESS: NORMAL
BH CV LOWER VASCULAR LEFT MID FEMORAL PHASIC: NORMAL
BH CV LOWER VASCULAR LEFT MID FEMORAL SPONT: NORMAL
BH CV LOWER VASCULAR LEFT PERONEAL COMPRESS: NORMAL
BH CV LOWER VASCULAR LEFT POPLITEAL AUGMENT: NORMAL
BH CV LOWER VASCULAR LEFT POPLITEAL COMPRESS: NORMAL
BH CV LOWER VASCULAR LEFT POPLITEAL PHASIC: NORMAL
BH CV LOWER VASCULAR LEFT POPLITEAL SPONT: NORMAL
BH CV LOWER VASCULAR LEFT POSTERIOR TIBIAL COMPRESS: NORMAL
BH CV LOWER VASCULAR LEFT PROFUNDA FEMORAL PHASIC: NORMAL
BH CV LOWER VASCULAR LEFT PROFUNDA FEMORAL SPONT: NORMAL
BH CV LOWER VASCULAR LEFT PROXIMAL FEMORAL AUGMENT: NORMAL
BH CV LOWER VASCULAR LEFT PROXIMAL FEMORAL COMPRESS: NORMAL
BH CV LOWER VASCULAR LEFT PROXIMAL FEMORAL PHASIC: NORMAL
BH CV LOWER VASCULAR LEFT PROXIMAL FEMORAL SPONT: NORMAL
BH CV LOWER VASCULAR LEFT SAPHENOFEMORAL JUNCTION AUGMENT: NORMAL
BH CV LOWER VASCULAR LEFT SAPHENOFEMORAL JUNCTION COMPRESS: NORMAL
BH CV LOWER VASCULAR LEFT SAPHENOFEMORAL JUNCTION PHASIC: NORMAL
BH CV LOWER VASCULAR LEFT SAPHENOFEMORAL JUNCTION SPONT: NORMAL
BH CV LOWER VASCULAR RIGHT COMMON FEMORAL AUGMENT: NORMAL
BH CV LOWER VASCULAR RIGHT COMMON FEMORAL PHASIC: NORMAL
BH CV LOWER VASCULAR RIGHT COMMON FEMORAL SPONT: NORMAL

## 2024-07-30 PROCEDURE — 93971 EXTREMITY STUDY: CPT

## 2025-02-10 ENCOUNTER — TRANSCRIBE ORDERS (OUTPATIENT)
Dept: LAB | Facility: HOSPITAL | Age: 35
End: 2025-02-10
Payer: COMMERCIAL

## 2025-02-10 ENCOUNTER — LAB (OUTPATIENT)
Dept: LAB | Facility: HOSPITAL | Age: 35
End: 2025-02-10
Payer: COMMERCIAL

## 2025-02-10 DIAGNOSIS — Z01.419 ROUTINE GYNECOLOGICAL EXAMINATION: Primary | ICD-10-CM

## 2025-02-10 DIAGNOSIS — Z01.419 ROUTINE GYNECOLOGICAL EXAMINATION: ICD-10-CM

## 2025-02-10 LAB
25(OH)D3 SERPL-MCNC: 30.5 NG/ML (ref 30–100)
ALBUMIN SERPL-MCNC: 4 G/DL (ref 3.5–5.2)
ALBUMIN/GLOB SERPL: 1.2 G/DL
ALP SERPL-CCNC: 92 U/L (ref 39–117)
ALT SERPL W P-5'-P-CCNC: 16 U/L (ref 1–33)
ANION GAP SERPL CALCULATED.3IONS-SCNC: 9 MMOL/L (ref 5–15)
AST SERPL-CCNC: 21 U/L (ref 1–32)
BILIRUB SERPL-MCNC: 0.3 MG/DL (ref 0–1.2)
BUN SERPL-MCNC: 9 MG/DL (ref 6–20)
BUN/CREAT SERPL: 7.6 (ref 7–25)
CALCIUM SPEC-SCNC: 9.3 MG/DL (ref 8.6–10.5)
CHLORIDE SERPL-SCNC: 106 MMOL/L (ref 98–107)
CHOLEST SERPL-MCNC: 184 MG/DL (ref 0–200)
CO2 SERPL-SCNC: 23 MMOL/L (ref 22–29)
CREAT SERPL-MCNC: 1.19 MG/DL (ref 0.57–1)
DEPRECATED RDW RBC AUTO: 38.7 FL (ref 37–54)
EGFRCR SERPLBLD CKD-EPI 2021: 62.4 ML/MIN/1.73
ERYTHROCYTE [DISTWIDTH] IN BLOOD BY AUTOMATED COUNT: 15.5 % (ref 12.3–15.4)
GLOBULIN UR ELPH-MCNC: 3.4 GM/DL
GLUCOSE SERPL-MCNC: 83 MG/DL (ref 65–99)
HBA1C MFR BLD: 6.1 % (ref 4.8–5.6)
HCT VFR BLD AUTO: 38.7 % (ref 34–46.6)
HDLC SERPL QL: 3.17
HDLC SERPL-MCNC: 58 MG/DL (ref 40–60)
HGB BLD-MCNC: 12.5 G/DL (ref 12–15.9)
LDLC SERPL CALC-MCNC: 100 MG/DL (ref 0–100)
MCH RBC QN AUTO: 23.2 PG (ref 26.6–33)
MCHC RBC AUTO-ENTMCNC: 32.3 G/DL (ref 31.5–35.7)
MCV RBC AUTO: 71.8 FL (ref 79–97)
PLATELET # BLD AUTO: 304 10*3/MM3 (ref 140–450)
PMV BLD AUTO: 11.2 FL (ref 6–12)
POTASSIUM SERPL-SCNC: 4 MMOL/L (ref 3.5–5.2)
PROT SERPL-MCNC: 7.4 G/DL (ref 6–8.5)
RBC # BLD AUTO: 5.39 10*6/MM3 (ref 3.77–5.28)
SODIUM SERPL-SCNC: 138 MMOL/L (ref 136–145)
T4 FREE SERPL-MCNC: 1.29 NG/DL (ref 0.92–1.68)
TRIGL SERPL-MCNC: 149 MG/DL (ref 0–150)
TSH SERPL DL<=0.05 MIU/L-ACNC: 0.94 UIU/ML (ref 0.27–4.2)
VLDLC SERPL-MCNC: 26 MG/DL (ref 5–40)
WBC NRBC COR # BLD AUTO: 6.71 10*3/MM3 (ref 3.4–10.8)

## 2025-02-10 PROCEDURE — 83036 HEMOGLOBIN GLYCOSYLATED A1C: CPT

## 2025-02-10 PROCEDURE — 80050 GENERAL HEALTH PANEL: CPT

## 2025-02-10 PROCEDURE — 36415 COLL VENOUS BLD VENIPUNCTURE: CPT

## 2025-02-10 PROCEDURE — 80061 LIPID PANEL: CPT

## 2025-02-10 PROCEDURE — 82306 VITAMIN D 25 HYDROXY: CPT

## 2025-02-10 PROCEDURE — 84439 ASSAY OF FREE THYROXINE: CPT
